# Patient Record
Sex: FEMALE | Race: WHITE | NOT HISPANIC OR LATINO | Employment: FULL TIME | URBAN - NONMETROPOLITAN AREA
[De-identification: names, ages, dates, MRNs, and addresses within clinical notes are randomized per-mention and may not be internally consistent; named-entity substitution may affect disease eponyms.]

---

## 2017-02-07 ENCOUNTER — OFFICE VISIT (OUTPATIENT)
Dept: GASTROENTEROLOGY | Facility: CLINIC | Age: 52
End: 2017-02-07

## 2017-02-07 VITALS
HEART RATE: 90 BPM | BODY MASS INDEX: 22.28 KG/M2 | HEIGHT: 68 IN | WEIGHT: 147 LBS | SYSTOLIC BLOOD PRESSURE: 102 MMHG | DIASTOLIC BLOOD PRESSURE: 60 MMHG | OXYGEN SATURATION: 99 % | TEMPERATURE: 97 F

## 2017-02-07 DIAGNOSIS — Z83.71 FAMILY HISTORY OF POLYPS IN THE COLON: ICD-10-CM

## 2017-02-07 DIAGNOSIS — Z12.11 COLON CANCER SCREENING: Primary | ICD-10-CM

## 2017-02-07 PROBLEM — Z83.719 FAMILY HISTORY OF POLYPS IN THE COLON: Status: ACTIVE | Noted: 2017-02-07

## 2017-02-07 PROCEDURE — S0260 H&P FOR SURGERY: HCPCS | Performed by: NURSE PRACTITIONER

## 2017-02-07 RX ORDER — SODIUM, POTASSIUM,MAG SULFATES 17.5-3.13G
2 SOLUTION, RECONSTITUTED, ORAL ORAL ONCE
Qty: 2 BOTTLE | Refills: 0 | Status: SHIPPED | OUTPATIENT
Start: 2017-02-07 | End: 2017-02-07

## 2017-02-07 NOTE — PROGRESS NOTES
Chief Complaint   Patient presents with   • Colon Cancer Screening     Patient is here as a new patient for colon screening.      Subjective   HPI  Rebeka Severino is a 51 y.o. female who presents as a referral for preventative maintenance. She has no complaints of nausea or vomiting. No change in bowels. No wt loss. No BRBPR. No melena. There is no family hx for colon cancer. No abdominal pain. The patient has not had a colonoscopy in the past.  History reviewed. No pertinent past medical history.  Past Surgical History   Procedure Laterality Date   • Dilatation and curettage     • Hysterectomy       without BSO   • Cholecystectomy         Current Outpatient Prescriptions:   •  cetirizine (zyrTEC) 10 MG tablet, Take 10 mg by mouth Daily., Disp: , Rfl:   •  DHEA 10 MG tablet, 1 po Q day, Disp: 30 tablet, Rfl: 12  •  estradiol (ESTRACE) 1 MG tablet, Take 1.5 tablets by mouth Daily., Disp: 45 tablet, Rfl: 12  •  spironolactone (ALDACTONE) 100 MG tablet, Take 1 tablet by mouth Daily., Disp: 60 tablet, Rfl: 12  •  sodium-potassium-magnesium sulfates (SUPREP BOWEL PREP) solution oral solution, Take 2 bottles by mouth 1 (One) Time for 1 dose. Split dose prep as directed by office instructions provided.  2 bottles = one kit., Disp: 2 bottle, Rfl: 0  No Known Allergies  Social History     Social History   • Marital status:      Spouse name: N/A   • Number of children: N/A   • Years of education: N/A     Occupational History   • Not on file.     Social History Main Topics   • Smoking status: Never Smoker   • Smokeless tobacco: Never Used   • Alcohol use No   • Drug use: No   • Sexual activity: Yes     Partners: Male     Birth control/ protection: Post-menopausal     Other Topics Concern   • Not on file     Social History Narrative     Family History   Problem Relation Age of Onset   • Coronary artery disease Father    • Colon polyps Father    • Hypertension Mother    • No Known Problems Brother    • No Known Problems  "Sister    • Breast cancer Maternal Aunt    • Breast cancer Cousin    • Ovarian cancer Neg Hx    • Uterine cancer Neg Hx    • Colon cancer Neg Hx    • Melanoma Neg Hx    • Prostate cancer Neg Hx    • Crohn's disease Neg Hx    • Esophageal cancer Neg Hx    • Liver cancer Neg Hx    • Liver disease Neg Hx    • Rectal cancer Neg Hx    • Stomach cancer Neg Hx        REVIEW OF SYSTEMS  General: well appearing, no fever chills or sweats, no unexplained wt loss  HEENT: no acute visual or hearing disturbances  Cardiovascular: No chest pain or palpitations  Pulmonary: No shortness of breath, coughing, wheezing or hemoptysis  : No burning, urgency, hematuria, or dysuria  Musculoskeletal: No joint pain or stiffness  Peripheral: no edema  Skin: No lesions or rashes  Neuro: No dizziness, headaches, stroke, syncope  Endocrine: No hot or cold intolerances  Hematological: No blood dyscrasias    Objective   Vitals:    02/07/17 0931   BP: 102/60   Pulse: 90   Temp: 97 °F (36.1 °C)   SpO2: 99%   Weight: 147 lb (66.7 kg)   Height: 68\" (172.7 cm)     Body mass index is 22.35 kg/(m^2).    PHYSICAL EXAM  General: age appropriate well nourished well appearing, no acute distress  Head: normocephalic and atraumatic  Global assessment-supple  Neck-No JVD noted, no lymphadenopathy  Pulmonary-clear to auscultation bilaterally, normal respiratory effort  Cardiovascular-normal rate and rhythm, normal heart sounds, S1 and S2 noted  Abdomen-soft, non tender, non distended, normal bowel sounds all 4 quadrants, no hepatosplenomegaly noted  Extremities-No clubbing cyanosis or edema  Neuro-Non focal, converses appropriately, awake, alert, oriented    Imaging Results (most recent)     None        Assessment/Plan   Rebeka was seen today for colon cancer screening.    Diagnoses and all orders for this visit:    Colon cancer screening  -     Case request    Family history of polyps in the colon  Comments:  father >60  Orders:  -     Case request    Other " orders  -     sodium-potassium-magnesium sulfates (SUPREP BOWEL PREP) solution oral solution; Take 2 bottles by mouth 1 (One) Time for 1 dose. Split dose prep as directed by office instructions provided.  2 bottles = one kit.      COLONOSCOPY WITH ANESTHESIA (N/A)  No Follow-up on file.  There are no Patient Instructions on file for this visit.    All risks, benefits, alternatives, and indications of colonoscopy procedure have been discussed with the patient. Risks to include perforation of the colon requiring possible surgery or colostomy, risk of bleeding from biopsies or removal of colon tissue, possibility of missing a colon polyp or cancer, or adverse drug reaction.  Benefits to include the diagnosis and management of disease of the colon and rectum. Alternatives to include barium enema, radiographic evaluation, lab testing or no intervention. Pt verbalizes understanding and agrees.

## 2017-02-28 ENCOUNTER — ANESTHESIA EVENT (OUTPATIENT)
Dept: GASTROENTEROLOGY | Facility: HOSPITAL | Age: 52
End: 2017-02-28

## 2017-03-02 ENCOUNTER — ANESTHESIA (OUTPATIENT)
Dept: GASTROENTEROLOGY | Facility: HOSPITAL | Age: 52
End: 2017-03-02

## 2017-03-02 ENCOUNTER — TELEPHONE (OUTPATIENT)
Dept: GASTROENTEROLOGY | Facility: CLINIC | Age: 52
End: 2017-03-02

## 2017-03-02 ENCOUNTER — HOSPITAL ENCOUNTER (OUTPATIENT)
Facility: HOSPITAL | Age: 52
Setting detail: HOSPITAL OUTPATIENT SURGERY
Discharge: HOME | End: 2017-03-02
Attending: INTERNAL MEDICINE | Admitting: INTERNAL MEDICINE

## 2017-03-02 VITALS
HEIGHT: 68 IN | WEIGHT: 148 LBS | RESPIRATION RATE: 21 BRPM | SYSTOLIC BLOOD PRESSURE: 101 MMHG | OXYGEN SATURATION: 100 % | BODY MASS INDEX: 22.43 KG/M2 | HEART RATE: 56 BPM | TEMPERATURE: 97.3 F | DIASTOLIC BLOOD PRESSURE: 75 MMHG

## 2017-03-02 PROCEDURE — 45378 DIAGNOSTIC COLONOSCOPY: CPT | Performed by: INTERNAL MEDICINE

## 2017-03-02 PROCEDURE — 25010000002 PROPOFOL 10 MG/ML EMULSION: Performed by: NURSE ANESTHETIST, CERTIFIED REGISTERED

## 2017-03-02 RX ORDER — SODIUM CHLORIDE 0.9 % (FLUSH) 0.9 %
1-10 SYRINGE (ML) INJECTION AS NEEDED
Status: CANCELLED | OUTPATIENT
Start: 2017-03-02

## 2017-03-02 RX ORDER — SODIUM CHLORIDE 9 MG/ML
100 INJECTION, SOLUTION INTRAVENOUS CONTINUOUS
Status: CANCELLED | OUTPATIENT
Start: 2017-03-02

## 2017-03-02 RX ORDER — LIDOCAINE HYDROCHLORIDE 20 MG/ML
INJECTION, SOLUTION INFILTRATION; PERINEURAL AS NEEDED
Status: DISCONTINUED | OUTPATIENT
Start: 2017-03-02 | End: 2017-03-02 | Stop reason: SURG

## 2017-03-02 RX ORDER — PROPOFOL 10 MG/ML
VIAL (ML) INTRAVENOUS AS NEEDED
Status: DISCONTINUED | OUTPATIENT
Start: 2017-03-02 | End: 2017-03-02 | Stop reason: SURG

## 2017-03-02 RX ORDER — SODIUM CHLORIDE 0.9 % (FLUSH) 0.9 %
1-10 SYRINGE (ML) INJECTION AS NEEDED
Status: DISCONTINUED | OUTPATIENT
Start: 2017-03-02 | End: 2017-03-02 | Stop reason: HOSPADM

## 2017-03-02 RX ORDER — SODIUM CHLORIDE 9 MG/ML
100 INJECTION, SOLUTION INTRAVENOUS CONTINUOUS
Status: DISCONTINUED | OUTPATIENT
Start: 2017-03-02 | End: 2017-03-02 | Stop reason: HOSPADM

## 2017-03-02 RX ADMIN — LIDOCAINE HYDROCHLORIDE 50 MG: 20 INJECTION, SOLUTION INFILTRATION; PERINEURAL at 07:35

## 2017-03-02 RX ADMIN — SODIUM CHLORIDE 100 ML/HR: 9 INJECTION, SOLUTION INTRAVENOUS at 07:04

## 2017-03-02 RX ADMIN — PROPOFOL 75 MG: 10 INJECTION, EMULSION INTRAVENOUS at 07:45

## 2017-03-02 RX ADMIN — PROPOFOL 100 MG: 10 INJECTION, EMULSION INTRAVENOUS at 07:38

## 2017-03-02 RX ADMIN — PROPOFOL 50 MG: 10 INJECTION, EMULSION INTRAVENOUS at 07:40

## 2017-03-02 RX ADMIN — PROPOFOL 50 MG: 10 INJECTION, EMULSION INTRAVENOUS at 07:50

## 2017-03-02 RX ADMIN — PROPOFOL 50 MG: 10 INJECTION, EMULSION INTRAVENOUS at 07:44

## 2017-03-02 NOTE — PLAN OF CARE
Problem: GI Endoscopy (Adult)  Goal: Signs and Symptoms of Listed Potential Problems Will be Absent or Manageable (GI Endoscopy)  Outcome: Outcome(s) achieved Date Met:  03/02/17

## 2017-03-02 NOTE — PLAN OF CARE
Problem: Patient Care Overview (Adult)  Goal: Plan of Care Review  Outcome: Ongoing (interventions implemented as appropriate)    03/02/17 0807   Patient Care Overview   Progress improving   Outcome Evaluation   Outcome Summary/Follow up Plan no problem noted during procedure

## 2017-03-02 NOTE — ANESTHESIA PREPROCEDURE EVALUATION
Anesthesia Evaluation     Patient summary reviewed   NPO Status: > 4 hours   Airway   Mallampati: I  TM distance: >3 FB  Neck ROM: full  no difficulty expected  Dental - normal exam     Pulmonary - negative pulmonary ROS and normal exam   Cardiovascular - negative cardio ROS  Exercise tolerance: good (4-7 METS)    Rhythm: regular  Rate: normal        Neuro/Psych  GI/Hepatic/Renal/Endo - negative ROS     Musculoskeletal (-) negative ROS    Abdominal  - normal exam   Substance History      OB/GYN negative ob/gyn ROS         Other                                    Anesthesia Plan    ASA 1     general     Anesthetic plan and risks discussed with patient.

## 2017-03-02 NOTE — ANESTHESIA POSTPROCEDURE EVALUATION
Patient: Rebeka Severino    Procedure Summary     Date Anesthesia Start Anesthesia Stop Room / Location    03/02/17 0735 0801  [facility] / [facility]       Procedure Diagnosis Surgeon Provider    COLONOSCOPY WITH ANESTHESIA (N/A ) Family history of polyps in the colon; Colon cancer screening  (Family history of polyps in the colon [Z83.71]; Colon cancer screening [Z12.11]) MD Jacob Beck CRNA          Anesthesia Type: general  Last vitals  BP      Temp      Pulse     Resp      SpO2        Post Anesthesia Care and Evaluation    Patient location during evaluation: PHASE II  Patient participation: complete - patient participated  Level of consciousness: awake  Pain score: 0  Pain management: adequate  Airway patency: patent  Anesthetic complications: No anesthetic complications  PONV Status: none  Cardiovascular status: acceptable  Respiratory status: acceptable  Hydration status: acceptable

## 2017-03-02 NOTE — PLAN OF CARE
Problem: Patient Care Overview (Adult)  Goal: Plan of Care Review  Outcome: Outcome(s) achieved Date Met:  03/02/17

## 2017-06-21 RX ORDER — FLUCONAZOLE 150 MG/1
150 TABLET ORAL DAILY
Qty: 2 TABLET | Refills: 0 | Status: SHIPPED | OUTPATIENT
Start: 2017-06-21 | End: 2017-11-27

## 2017-06-21 NOTE — TELEPHONE ENCOUNTER
Patient called this morning c/o yeast infection. Patient states she has tried a few OTC remedies and have not helped. Patient is requesting Vani Velez to call her something in. Sent to Vani to review.

## 2017-07-27 RX ORDER — BUSPIRONE HYDROCHLORIDE 10 MG/1
10 TABLET ORAL DAILY
Qty: 30 TABLET | Refills: 6 | Status: SHIPPED | OUTPATIENT
Start: 2017-07-27 | End: 2017-11-27 | Stop reason: SDUPTHER

## 2017-07-27 NOTE — TELEPHONE ENCOUNTER
Patient requesting refill on Buspirone HCL 10 mg, po daily. Chart is reviewed in [employer]. Original RX wrote 09/22/15. Patient is up to date on yearly until November. Sent to Vani Velez to approve.

## 2017-09-21 DIAGNOSIS — B37.9 CANDIDIASIS: Primary | ICD-10-CM

## 2017-09-21 RX ORDER — FLUCONAZOLE 150 MG/1
150 TABLET ORAL ONCE
Qty: 2 TABLET | Refills: 0 | Status: SHIPPED | OUTPATIENT
Start: 2017-09-21 | End: 2017-09-21

## 2017-11-27 ENCOUNTER — OFFICE VISIT (OUTPATIENT)
Dept: OBSTETRICS AND GYNECOLOGY | Facility: CLINIC | Age: 52
End: 2017-11-27

## 2017-11-27 VITALS
HEIGHT: 68 IN | DIASTOLIC BLOOD PRESSURE: 68 MMHG | BODY MASS INDEX: 22.58 KG/M2 | WEIGHT: 149 LBS | SYSTOLIC BLOOD PRESSURE: 126 MMHG

## 2017-11-27 DIAGNOSIS — F41.9 ANXIETY: ICD-10-CM

## 2017-11-27 DIAGNOSIS — Z01.419 WELL WOMAN EXAM WITH ROUTINE GYNECOLOGICAL EXAM: Primary | ICD-10-CM

## 2017-11-27 DIAGNOSIS — E01.0 THYROMEGALY: ICD-10-CM

## 2017-11-27 DIAGNOSIS — Z80.3 FAMILY HISTORY OF BREAST CANCER: ICD-10-CM

## 2017-11-27 DIAGNOSIS — L68.0 HIRSUTISM: ICD-10-CM

## 2017-11-27 DIAGNOSIS — N95.1 MENOPAUSAL SYMPTOMS: ICD-10-CM

## 2017-11-27 PROCEDURE — 99213 OFFICE O/P EST LOW 20 MIN: CPT | Performed by: NURSE PRACTITIONER

## 2017-11-27 PROCEDURE — 99396 PREV VISIT EST AGE 40-64: CPT | Performed by: NURSE PRACTITIONER

## 2017-11-27 RX ORDER — BUSPIRONE HYDROCHLORIDE 10 MG/1
10 TABLET ORAL DAILY
Qty: 30 TABLET | Refills: 12 | Status: SHIPPED | OUTPATIENT
Start: 2017-11-27 | End: 2020-06-02 | Stop reason: HOSPADM

## 2017-11-27 RX ORDER — ESTRADIOL 1 MG/1
1.5 TABLET ORAL DAILY
Qty: 45 TABLET | Refills: 12 | Status: SHIPPED | OUTPATIENT
Start: 2017-11-27 | End: 2019-02-18 | Stop reason: SDUPTHER

## 2017-11-27 RX ORDER — SPIRONOLACTONE 100 MG/1
100 TABLET, FILM COATED ORAL DAILY
Qty: 60 TABLET | Refills: 12 | Status: SHIPPED | OUTPATIENT
Start: 2017-11-27 | End: 2019-01-10 | Stop reason: SDUPTHER

## 2017-11-27 NOTE — PROGRESS NOTES
"Subjective   Rebeka Severino is a 52 y.o. female     HPI Comments: Here for yearly. Has C/O hot flashes.    Gynecologic Exam   The patient's pertinent negatives include no pelvic pain, vaginal bleeding or vaginal discharge. The patient is experiencing no pain. Pertinent negatives include no abdominal pain, anorexia, back pain, chills, constipation, diarrhea, discolored urine, dysuria, fever, flank pain, frequency, headaches, hematuria, joint pain, joint swelling, nausea, painful intercourse, rash, sore throat, urgency or vomiting. She is sexually active. She uses hysterectomy for contraception. She is postmenopausal.   Anxiety   Presents for follow-up visit. Symptoms include excessive worry and nervous/anxious behavior. Patient reports no chest pain, compulsions, confusion, decreased concentration, depressed mood, dizziness, dry mouth, feeling of choking, hyperventilation, impotence, insomnia, irritability, malaise, muscle tension, nausea, obsessions, palpitations, panic, restlessness, shortness of breath or suicidal ideas. Symptoms occur constantly. The severity of symptoms is mild. The quality of sleep is good. Nighttime awakenings: occasional.     Compliance with medications is %.             /68  Ht 68\" (172.7 cm)  Wt 149 lb (67.6 kg)  LMP 11/27/2009 (Approximate)  BMI 22.66 kg/m2      Outpatient Encounter Prescriptions as of 11/27/2017   Medication Sig Dispense Refill   • cetirizine (zyrTEC) 10 MG tablet Take 10 mg by mouth Daily.     • DHEA 10 MG tablet 1 po Q day 30 tablet 12   • estradiol (ESTRACE) 1 MG tablet Take 1.5 tablets by mouth Daily. 45 tablet 12   • spironolactone (ALDACTONE) 100 MG tablet Take 1 tablet by mouth Daily. 60 tablet 12   • [DISCONTINUED] estradiol (ESTRACE) 1 MG tablet Take 1.5 tablets by mouth Daily. 45 tablet 12   • [DISCONTINUED] spironolactone (ALDACTONE) 100 MG tablet Take 1 tablet by mouth Daily. 60 tablet 12   • busPIRone (BUSPAR) 10 MG tablet Take 1 tablet by mouth " Daily. 30 tablet 12   • [DISCONTINUED] busPIRone (BUSPAR) 10 MG tablet Take 1 tablet by mouth Daily. 30 tablet 6   • [DISCONTINUED] fluconazole (DIFLUCAN) 150 MG tablet Take 1 tablet by mouth Daily. 2 tablet 0     No facility-administered encounter medications on file as of 11/27/2017.            Surgical History  Past Surgical History:   Procedure Laterality Date   • CHOLECYSTECTOMY     • COLONOSCOPY N/A 3/2/2017    Procedure: COLONOSCOPY WITH ANESTHESIA;  Surgeon: Jennifer Gallegos MD;  Location: [facility] ENDOSCOPY;  Service:    • DILATATION AND CURETTAGE     • HYSTERECTOMY      without BSO         Family History  Family History   Problem Relation Age of Onset   • Coronary artery disease Father    • Colon polyps Father    • Hypertension Mother    • No Known Problems Brother    • No Known Problems Sister    • Breast cancer Maternal Aunt 60   • Breast cancer Cousin 40   • Ovarian cancer Neg Hx    • Uterine cancer Neg Hx    • Colon cancer Neg Hx    • Melanoma Neg Hx    • Prostate cancer Neg Hx    • Crohn's disease Neg Hx    • Esophageal cancer Neg Hx    • Liver cancer Neg Hx    • Liver disease Neg Hx    • Rectal cancer Neg Hx    • Stomach cancer Neg Hx              The following portions of the patient's history were reviewed and updated as appropriate: allergies, current medications, past family history, past medical history, past social history, past surgical history and problem list.    Review of Systems   Constitutional: Negative for activity change, appetite change, chills, diaphoresis, fatigue, fever, irritability and unexpected weight change.   HENT: Negative for congestion, ear discharge, ear pain, facial swelling, hearing loss, mouth sores, nosebleeds, postnasal drip, rhinorrhea, sinus pressure, sneezing, sore throat, tinnitus, trouble swallowing and voice change.    Eyes: Negative for photophobia, pain, discharge, redness, itching and visual disturbance.   Respiratory: Negative for apnea, cough, choking, chest  tightness and shortness of breath.    Cardiovascular: Negative for chest pain, palpitations and leg swelling.   Gastrointestinal: Negative for abdominal distention, abdominal pain, anal bleeding, anorexia, blood in stool, constipation, diarrhea, nausea, rectal pain and vomiting.   Endocrine: Negative for cold intolerance and heat intolerance.   Genitourinary: Negative for decreased urine volume, difficulty urinating, dyspareunia, dysuria, flank pain, frequency, genital sores, hematuria, impotence, menstrual problem, pelvic pain, urgency, vaginal bleeding, vaginal discharge and vaginal pain.   Musculoskeletal: Negative for arthralgias, back pain, joint pain, joint swelling and myalgias.   Skin: Negative for color change and rash.   Allergic/Immunologic: Negative for environmental allergies.   Neurological: Negative for dizziness, syncope, weakness, numbness and headaches.   Hematological: Negative for adenopathy.   Psychiatric/Behavioral: Negative for agitation, confusion, decreased concentration, sleep disturbance and suicidal ideas. The patient is nervous/anxious. The patient does not have insomnia.              Objective   Physical Exam   Constitutional: She is oriented to person, place, and time. She appears well-developed and well-nourished. No distress.   HENT:   Head: Normocephalic.   Right Ear: External ear normal.   Left Ear: External ear normal.   Nose: Nose normal.   Mouth/Throat: Oropharynx is clear and moist.   Eyes: Conjunctivae are normal. Right eye exhibits no discharge. Left eye exhibits no discharge. No scleral icterus.   Neck: Normal range of motion. Neck supple. Carotid bruit is not present. No tracheal deviation present. Thyromegaly present.   Cardiovascular: Normal rate, regular rhythm, normal heart sounds and intact distal pulses.    No murmur heard.  Pulmonary/Chest: Effort normal and breath sounds normal. No respiratory distress. She has no wheezes. Right breast exhibits no inverted nipple,  no mass, no nipple discharge, no skin change and no tenderness. Left breast exhibits no inverted nipple, no mass, no nipple discharge, no skin change and no tenderness. Breasts are symmetrical. There is no breast swelling.   Abdominal: Soft. She exhibits no distension and no mass. There is no tenderness. There is no guarding. No hernia. Hernia confirmed negative in the right inguinal area and confirmed negative in the left inguinal area.   Genitourinary: Vagina normal. Rectal exam shows no mass. No breast tenderness, discharge or bleeding. Pelvic exam was performed with patient supine. There is no rash, tenderness, lesion or injury on the right labia. There is no rash, tenderness, lesion or injury on the left labia. Right adnexum displays no mass, no tenderness and no fullness. Left adnexum displays no mass, no tenderness and no fullness. No erythema, tenderness or bleeding in the vagina. No foreign body in the vagina. No signs of injury around the vagina. No vaginal discharge found.   Genitourinary Comments:   BSU normal  Urethral meatus  Normal  Perineum  Normal   Musculoskeletal: Normal range of motion. She exhibits no edema or tenderness.   Lymphadenopathy:        Head (right side): No submental, no submandibular, no tonsillar, no preauricular, no posterior auricular and no occipital adenopathy present.        Head (left side): No submental, no submandibular, no tonsillar, no preauricular, no posterior auricular and no occipital adenopathy present.     She has no cervical adenopathy.        Right cervical: No superficial cervical, no deep cervical and no posterior cervical adenopathy present.       Left cervical: No superficial cervical, no deep cervical and no posterior cervical adenopathy present.     She has no axillary adenopathy.        Right: No inguinal adenopathy present.        Left: No inguinal adenopathy present.   Neurological: She is alert and oriented to person, place, and time. Coordination  normal.   Skin: Skin is warm and dry. No bruising and no rash noted. She is not diaphoretic. No erythema.   Psychiatric: She has a normal mood and affect. Her behavior is normal. Judgment and thought content normal.   Nursing note and vitals reviewed.        Assessment/Plan   Rebeka was seen today for gynecologic exam.    Diagnoses and all orders for this visit:    Well woman exam with routine gynecological exam  Normal GYN exam. Will RTO for lab work Encouraged SBE. Discussed weight management and importance of maintaining a healthy weight. Discussed Vitamin D intake and the importance of adequate vitamin D. Discussed Daily exercise and the importance of same. BMI is 22.7.  Colonoscopy is up to date.  Mammogram and Bone density will be scheduled at [facility].  Pap smear is not done per ASCCP guidelines.  -     CBC & Differential  -     Comprehensive Metabolic Panel  -     Lipid Panel With LDL / HDL Ratio  -     TSH  -     T3, Uptake  -     Vitamin D 25 Hydroxy  -     T4, Free  -     Hemoglobin A1c  -     UA / M With / Rflx Culture([facility] ONLY) - Urine, Clean Catch  -     Mammo Screening Digital Tomosynthesis Bilateral With CAD; Future  -     DEXA Bone Density Axial; Future    Menopausal symptoms  Comments:  Having some hot flashes, even taking Estrace 1 1/2 mg per day. Will check Estrogen level.  Orders:  -     Estradiol  -     estradiol (ESTRACE) 1 MG tablet; Take 1.5 tablets by mouth Daily.  -     DHEA-Sulfate    Thyromegaly  Comments:  Thyroid is enlarged, will RTO for lab and will get Thyroid U/S at [facility].  Orders:  -     Thyroid Antibodies  -     US Thyroid; Future    Family history of breast cancer  Comments:  Pt has a strong family history of breast cancer, 2 aunts in their 60's, but a Maternal cousin in her 40's. Discussed My Risk testing.    Anxiety  Comments:  Doing well with Buspar.  Orders:  -     busPIRone (BUSPAR) 10 MG tablet; Take 1 tablet by mouth Daily.    Hirsutism  Comments:  Doing well with  Aldactone.  Orders:  -     spironolactone (ALDACTONE) 100 MG tablet; Take 1 tablet by mouth Daily.

## 2017-12-05 ENCOUNTER — HOSPITAL ENCOUNTER (OUTPATIENT)
Dept: MAMMOGRAPHY | Facility: HOSPITAL | Age: 52
Discharge: HOME | End: 2017-12-05
Admitting: NURSE PRACTITIONER

## 2017-12-05 ENCOUNTER — HOSPITAL ENCOUNTER (OUTPATIENT)
Dept: ULTRASOUND IMAGING | Facility: HOSPITAL | Age: 52
Discharge: HOME | End: 2017-12-05

## 2017-12-05 ENCOUNTER — HOSPITAL ENCOUNTER (OUTPATIENT)
Dept: BONE DENSITY | Facility: HOSPITAL | Age: 52
Discharge: HOME | End: 2017-12-05

## 2017-12-05 DIAGNOSIS — Z01.419 WELL WOMAN EXAM WITH ROUTINE GYNECOLOGICAL EXAM: ICD-10-CM

## 2017-12-05 DIAGNOSIS — E01.0 THYROMEGALY: ICD-10-CM

## 2017-12-05 DIAGNOSIS — M85.89 OSTEOPENIA OF MULTIPLE SITES: Primary | ICD-10-CM

## 2017-12-05 PROCEDURE — 77063 BREAST TOMOSYNTHESIS BI: CPT

## 2017-12-05 PROCEDURE — 76536 US EXAM OF HEAD AND NECK: CPT

## 2017-12-05 PROCEDURE — G0202 SCR MAMMO BI INCL CAD: HCPCS

## 2017-12-05 PROCEDURE — 77080 DXA BONE DENSITY AXIAL: CPT

## 2017-12-05 RX ORDER — RISEDRONATE SODIUM 150 MG/1
150 TABLET, FILM COATED ORAL
Qty: 1 TABLET | Refills: 12 | Status: SHIPPED | OUTPATIENT
Start: 2017-12-05 | End: 2019-03-04

## 2017-12-06 LAB
25(OH)D3+25(OH)D2 SERPL-MCNC: 57.8 NG/ML (ref 30–100)
ALBUMIN SERPL-MCNC: 4.3 G/DL (ref 3.5–5)
ALBUMIN/GLOB SERPL: 1.7 G/DL (ref 1.1–2.5)
ALP SERPL-CCNC: 49 U/L (ref 24–120)
ALT SERPL-CCNC: 31 U/L (ref 0–54)
APPEARANCE UR: CLEAR
AST SERPL-CCNC: 24 U/L (ref 7–45)
BACTERIA #/AREA URNS HPF: NORMAL /HPF
BASOPHILS # BLD AUTO: 0.03 10*3/MM3 (ref 0–0.2)
BASOPHILS NFR BLD AUTO: 0.7 % (ref 0–2)
BILIRUB SERPL-MCNC: 0.8 MG/DL (ref 0.1–1)
BILIRUB UR QL STRIP: NEGATIVE
BUN SERPL-MCNC: 14 MG/DL (ref 5–21)
BUN/CREAT SERPL: 17.9 (ref 7–25)
CALCIUM SERPL-MCNC: 9.4 MG/DL (ref 8.4–10.4)
CHLORIDE SERPL-SCNC: 101 MMOL/L (ref 98–110)
CHOLEST SERPL-MCNC: 132 MG/DL (ref 130–200)
CO2 SERPL-SCNC: 31 MMOL/L (ref 24–31)
COLOR UR: YELLOW
CREAT SERPL-MCNC: 0.78 MG/DL (ref 0.5–1.4)
DHEA-S SERPL-MCNC: 41.1 UG/DL (ref 41.2–243.7)
EOSINOPHIL # BLD AUTO: 0.2 10*3/MM3 (ref 0–0.7)
EOSINOPHIL NFR BLD AUTO: 5 % (ref 0–4)
EPI CELLS #/AREA URNS HPF: NORMAL /HPF
ERYTHROCYTE [DISTWIDTH] IN BLOOD BY AUTOMATED COUNT: 13.5 % (ref 12–15)
ESTRADIOL SERPL-MCNC: 53.3 PG/ML
GFR SERPLBLD CREATININE-BSD FMLA CKD-EPI: 78 ML/MIN/1.73
GFR SERPLBLD CREATININE-BSD FMLA CKD-EPI: 94 ML/MIN/1.73
GLOBULIN SER CALC-MCNC: 2.6 GM/DL
GLUCOSE SERPL-MCNC: 85 MG/DL (ref 70–100)
GLUCOSE UR QL: NEGATIVE
HBA1C MFR BLD: 5.1 %
HCT VFR BLD AUTO: 39.4 % (ref 37–47)
HDLC SERPL-MCNC: 58 MG/DL
HGB BLD-MCNC: 12.4 G/DL (ref 12–16)
HGB UR QL STRIP: NEGATIVE
IMM GRANULOCYTES # BLD: 0.01 10*3/MM3 (ref 0–0.03)
IMM GRANULOCYTES NFR BLD: 0.2 % (ref 0–5)
KETONES UR QL STRIP: NEGATIVE
LDLC SERPL CALC-MCNC: 62 MG/DL (ref 0–99)
LDLC/HDLC SERPL: 1.07 {RATIO}
LEUKOCYTE ESTERASE UR QL STRIP: NEGATIVE
LYMPHOCYTES # BLD AUTO: 1.48 10*3/MM3 (ref 0.72–4.86)
LYMPHOCYTES NFR BLD AUTO: 36.7 % (ref 15–45)
MCH RBC QN AUTO: 29.7 PG (ref 28–32)
MCHC RBC AUTO-ENTMCNC: 31.5 G/DL (ref 33–36)
MCV RBC AUTO: 94.3 FL (ref 82–98)
MICRO URNS: NORMAL
MICRO URNS: NORMAL
MONOCYTES # BLD AUTO: 0.39 10*3/MM3 (ref 0.19–1.3)
MONOCYTES NFR BLD AUTO: 9.7 % (ref 4–12)
MUCOUS THREADS URNS QL MICRO: PRESENT /HPF
NEUTROPHILS # BLD AUTO: 1.92 10*3/MM3 (ref 1.87–8.4)
NEUTROPHILS NFR BLD AUTO: 47.7 % (ref 39–78)
NITRITE UR QL STRIP: NEGATIVE
NRBC BLD AUTO-RTO: 0 /100 WBC (ref 0–0)
PH UR STRIP: 5.5 [PH] (ref 5–7.5)
PLATELET # BLD AUTO: 221 10*3/MM3 (ref 130–400)
POTASSIUM SERPL-SCNC: 3.9 MMOL/L (ref 3.5–5.3)
PROT SERPL-MCNC: 6.9 G/DL (ref 6.3–8.7)
PROT UR QL STRIP: NEGATIVE
RBC # BLD AUTO: 4.18 10*6/MM3 (ref 4.2–5.4)
RBC #/AREA URNS HPF: NORMAL /HPF
SODIUM SERPL-SCNC: 142 MMOL/L (ref 135–145)
SP GR UR: 1.02 (ref 1–1.03)
T3RU NFR SERPL: 26 % (ref 24–39)
T4 FREE SERPL-MCNC: 1.13 NG/DL (ref 0.78–2.19)
THYROGLOB AB SERPL-ACNC: <1 IU/ML (ref 0–0.9)
THYROPEROXIDASE AB SERPL-ACNC: <6 IU/ML (ref 0–34)
TRIGL SERPL-MCNC: 59 MG/DL (ref 0–149)
TSH SERPL DL<=0.005 MIU/L-ACNC: 1.33 MIU/ML (ref 0.47–4.68)
URINALYSIS REFLEX: NORMAL
UROBILINOGEN UR STRIP-MCNC: 0.2 MG/DL (ref 0.2–1)
VLDLC SERPL CALC-MCNC: 11.8 MG/DL
WBC # BLD AUTO: 4.03 10*3/MM3 (ref 4.8–10.8)
WBC #/AREA URNS HPF: NORMAL /HPF

## 2018-05-24 ENCOUNTER — TELEPHONE (OUTPATIENT)
Dept: OBSTETRICS AND GYNECOLOGY | Facility: CLINIC | Age: 53
End: 2018-05-24

## 2018-05-24 RX ORDER — FLUCONAZOLE 150 MG/1
150 TABLET ORAL DAILY
Qty: 2 TABLET | Refills: 0 | Status: SHIPPED | OUTPATIENT
Start: 2018-05-24 | End: 2019-03-04

## 2018-05-24 NOTE — TELEPHONE ENCOUNTER
Per az Cisse to send in Diflucan 150mg to pt pharmacy. Take 1 now and repeat in 1 week. No refills. Send to [facility].

## 2019-01-07 DIAGNOSIS — L68.0 HIRSUTISM: ICD-10-CM

## 2019-01-07 RX ORDER — SPIRONOLACTONE 100 MG/1
100 TABLET, FILM COATED ORAL DAILY
Qty: 60 TABLET | Refills: 3 | OUTPATIENT
Start: 2019-01-07

## 2019-01-10 DIAGNOSIS — L68.0 HIRSUTISM: ICD-10-CM

## 2019-01-10 NOTE — TELEPHONE ENCOUNTER
Pt left voicemail she knows she needs to make an appt. Please call her to schedule appt yearly or med refill

## 2019-01-15 RX ORDER — SPIRONOLACTONE 100 MG/1
100 TABLET, FILM COATED ORAL DAILY
Qty: 60 TABLET | Refills: 0 | Status: SHIPPED | OUTPATIENT
Start: 2019-01-15 | End: 2019-03-14 | Stop reason: SDUPTHER

## 2019-02-16 DIAGNOSIS — N95.1 MENOPAUSAL SYMPTOMS: ICD-10-CM

## 2019-02-18 ENCOUNTER — TELEPHONE (OUTPATIENT)
Dept: OBSTETRICS AND GYNECOLOGY | Facility: CLINIC | Age: 54
End: 2019-02-18

## 2019-02-18 DIAGNOSIS — N95.1 MENOPAUSAL SYMPTOMS: ICD-10-CM

## 2019-02-18 RX ORDER — ESTRADIOL 1 MG/1
1.5 TABLET ORAL DAILY
Qty: 45 TABLET | Refills: 1 | Status: SHIPPED | OUTPATIENT
Start: 2019-02-18 | End: 2019-03-21 | Stop reason: SDUPTHER

## 2019-02-18 RX ORDER — ESTRADIOL 1 MG/1
1.5 TABLET ORAL DAILY
Qty: 135 TABLET | Refills: 3 | OUTPATIENT
Start: 2019-02-18

## 2019-02-18 NOTE — TELEPHONE ENCOUNTER
Pt is requesting refill of her estradiol her appt was rescheduled due to provider being out sick and she is scheduled 3/19/19. [facility]

## 2019-03-04 ENCOUNTER — OFFICE VISIT (OUTPATIENT)
Dept: OBSTETRICS AND GYNECOLOGY | Facility: CLINIC | Age: 54
End: 2019-03-04

## 2019-03-04 VITALS
BODY MASS INDEX: 23.04 KG/M2 | WEIGHT: 152 LBS | SYSTOLIC BLOOD PRESSURE: 114 MMHG | DIASTOLIC BLOOD PRESSURE: 70 MMHG | HEIGHT: 68 IN

## 2019-03-04 DIAGNOSIS — F40.243 ANXIETY WITH FLYING: ICD-10-CM

## 2019-03-04 DIAGNOSIS — Z12.31 SCREENING MAMMOGRAM, ENCOUNTER FOR: ICD-10-CM

## 2019-03-04 DIAGNOSIS — Z01.419 ENCOUNTER FOR GYNECOLOGICAL EXAMINATION WITHOUT ABNORMAL FINDING: Primary | ICD-10-CM

## 2019-03-04 PROCEDURE — 99396 PREV VISIT EST AGE 40-64: CPT | Performed by: OBSTETRICS & GYNECOLOGY

## 2019-03-04 RX ORDER — ALPRAZOLAM 0.25 MG/1
0.25 TABLET ORAL 2 TIMES DAILY PRN
Qty: 15 TABLET | Refills: 0 | Status: SHIPPED | OUTPATIENT
Start: 2019-03-04 | End: 2021-06-22

## 2019-03-04 RX ORDER — MAGNESIUM GLUCONATE 27 MG(500)
27 TABLET ORAL 2 TIMES DAILY
COMMUNITY

## 2019-03-04 NOTE — PROGRESS NOTES
Subjective   Rebeka Severino is a 53 y.o. female  YOB: 1965    Chief Complaint   Patient presents with   • Gynecologic Exam     Here for annual exam, had hysterectomy for bleeding issues, still has ovaries.  Need mammogram for BHP.        54 yo  Patient's last menstrual period was 2009 due to hysterectomy presents for annual examination. Last annual examination was in 2017. Patient offers no complaints at this time.             No Known Allergies    Past Medical History:   Diagnosis Date   • Anxiety        Family History   Problem Relation Age of Onset   • Coronary artery disease Father    • Colon polyps Father    • Hypertension Mother    • Aortic aneurysm Mother    • No Known Problems Brother    • No Known Problems Sister    • Breast cancer Maternal Aunt 60   • Breast cancer Cousin 40   • No Known Problems Daughter    • No Known Problems Son    • No Known Problems Maternal Grandmother    • No Known Problems Paternal Grandmother    • No Known Problems Paternal Aunt    • Ovarian cancer Neg Hx    • Uterine cancer Neg Hx    • Colon cancer Neg Hx    • Melanoma Neg Hx    • Prostate cancer Neg Hx    • Crohn's disease Neg Hx    • Esophageal cancer Neg Hx    • Liver cancer Neg Hx    • Liver disease Neg Hx    • Rectal cancer Neg Hx    • Stomach cancer Neg Hx    • BRCA 1/2 Neg Hx    • Endometrial cancer Neg Hx        Social History     Socioeconomic History   • Marital status:      Spouse name: Not on file   • Number of children: Not on file   • Years of education: Not on file   • Highest education level: Not on file   Social Needs   • Financial resource strain: Not on file   • Food insecurity - worry: Not on file   • Food insecurity - inability: Not on file   • Transportation needs - medical: Not on file   • Transportation needs - non-medical: Not on file   Occupational History   • Not on file   Tobacco Use   • Smoking status: Never Smoker   • Smokeless tobacco: Never Used   Substance and  Sexual Activity   • Alcohol use: No   • Drug use: No   • Sexual activity: Yes     Birth control/protection: Post-menopausal, Surgical   Other Topics Concern   • Not on file   Social History Narrative   • Not on file         Current Outpatient Medications:   •  cetirizine (zyrTEC) 10 MG tablet, Take 10 mg by mouth Daily., Disp: , Rfl:   •  DHEA 10 MG tablet, 1 po Q day, Disp: 30 tablet, Rfl: 12  •  estradiol (ESTRACE) 1 MG tablet, Take 1.5 tablets by mouth Daily., Disp: 45 tablet, Rfl: 1  •  magnesium gluconate (MAGONATE) 500 MG tablet, Take 27 mg by mouth 2 (Two) Times a Day., Disp: , Rfl:   •  spironolactone (ALDACTONE) 100 MG tablet, TAKE 1 TABLET BY MOUTH DAILY., Disp: 60 tablet, Rfl: 0  •  ALPRAZolam (XANAX) 0.25 MG tablet, Take 1 tablet by mouth 2 (Two) Times a Day As Needed for Anxiety., Disp: 15 tablet, Rfl: 0  •  busPIRone (BUSPAR) 10 MG tablet, Take 1 tablet by mouth Daily., Disp: 30 tablet, Rfl: 12    Patient's last menstrual period was 11/27/2009 (approximate).    Sexual History:         Could not be calculated    Past Surgical History:   Procedure Laterality Date   • CHOLECYSTECTOMY     • COLONOSCOPY N/A 3/2/2017    Procedure: COLONOSCOPY WITH ANESTHESIA;  Surgeon: Jennifer Gallegos MD;  Location: [facility] ENDOSCOPY;  Service:    • DILATATION AND CURETTAGE     • HYSTERECTOMY      without BSO       Review of Systems   Constitutional: Negative for activity change and unexpected weight loss.   HENT: Negative for congestion.    Cardiovascular: Negative for chest pain.   Gastrointestinal: Negative for blood in stool, constipation and diarrhea.   Endocrine: Negative for cold intolerance and heat intolerance.   Genitourinary: Negative for dyspareunia, pelvic pain and vaginal discharge.   Musculoskeletal: Negative for arthralgias, back pain, neck pain and neck stiffness.   Skin: Negative for rash.   Neurological: Negative for dizziness and headache.   Psychiatric/Behavioral: Negative for sleep disturbance. The  "patient is nervous/anxious (only when patient is flying).        Objective   Physical Exam   Constitutional: She is oriented to person, place, and time. She appears well-developed and well-nourished. No distress.   HENT:   Head: Normocephalic and atraumatic.   Eyes: EOM are normal.   Neck: Normal range of motion. Neck supple. No thyromegaly present.   Cardiovascular: Normal rate and regular rhythm.   No murmur heard.  Pulmonary/Chest: Effort normal and breath sounds normal.   Abdominal: Soft. She exhibits no distension. There is no tenderness.   Genitourinary: Vagina normal. Pelvic exam was performed with patient supine. There is no tenderness or lesion on the right labia. There is no tenderness or lesion on the left labia. Right adnexum displays no tenderness and no fullness. Left adnexum displays no tenderness and no fullness. No bleeding in the vagina. No vaginal discharge found.   Genitourinary Comments: Pt s/p hysterectomy, uterus and cervix surgically absent, vaginal cuff unremarkable.    Musculoskeletal: Normal range of motion. She exhibits no edema.   Neurological: She is alert and oriented to person, place, and time.   Skin: Skin is warm and dry.   Psychiatric: She has a normal mood and affect. Her behavior is normal. Judgment normal.   Nursing note and vitals reviewed.        Vitals:    03/04/19 0843   BP: 114/70   BP Location: Left arm   Patient Position: Sitting   Cuff Size: Adult   Weight: 68.9 kg (152 lb)   Height: 172.7 cm (68\")       Rebeka was seen today for gynecologic exam.    Diagnoses and all orders for this visit:    Encounter for gynecological examination without abnormal finding  -     CBC & Differential  -     Cancel: Lipid Panel With LDL / HDL Ratio  -     TSH  -     T3, Uptake  -     Vitamin D 25 Hydroxy  -     T4, Free  -     Hemoglobin A1c  -     DHEA-Sulfate  -     Urinalysis With Microscopic If Indicated (No Culture) - Urine, Clean Catch  -     Estradiol    Screening mammogram, " encounter for  -     Mammo Screening Digital Tomosynthesis Bilateral With CAD; Future    Other orders  -     ALPRAZolam (XANAX) 0.25 MG tablet; Take 1 tablet by mouth 2 (Two) Times a Day As Needed for Anxiety.    -Discussed with patient that there was no need to do PAP smear at this time since she has previous history of normal PAP smears   -Sent referral for mammogram at this time   -Labs ordered with results to follow   -Xanax sent to pharmacy.     Amanda Fortune, DO

## 2019-03-04 NOTE — PROGRESS NOTES
,Attempted to obtain health maintenance information, patient unable to provide answers for the following items Tdap, Influenza Vaccine and Zoster Vaccine.

## 2019-03-05 LAB
25(OH)D3+25(OH)D2 SERPL-MCNC: 46.7 NG/ML (ref 30–100)
BASOPHILS # BLD AUTO: 0.04 10*3/MM3 (ref 0–0.2)
BASOPHILS NFR BLD AUTO: 1 % (ref 0–2)
DHEA-S SERPL-MCNC: 307.8 UG/DL (ref 41.2–243.7)
EOSINOPHIL # BLD AUTO: 0.15 10*3/MM3 (ref 0–0.7)
EOSINOPHIL NFR BLD AUTO: 3.6 % (ref 0–4)
ERYTHROCYTE [DISTWIDTH] IN BLOOD BY AUTOMATED COUNT: 13 % (ref 12–15)
ESTRADIOL SERPL-MCNC: 28.7 PG/ML
HBA1C MFR BLD: 5.1 %
HCT VFR BLD AUTO: 39.7 % (ref 37–47)
HGB BLD-MCNC: 12.8 G/DL (ref 12–16)
IMM GRANULOCYTES # BLD AUTO: 0.01 10*3/MM3 (ref 0–0.05)
IMM GRANULOCYTES NFR BLD AUTO: 0.2 % (ref 0–5)
LYMPHOCYTES # BLD AUTO: 1.39 10*3/MM3 (ref 0.72–4.86)
LYMPHOCYTES NFR BLD AUTO: 33.7 % (ref 15–45)
MCH RBC QN AUTO: 30.6 PG (ref 28–32)
MCHC RBC AUTO-ENTMCNC: 32.2 G/DL (ref 33–36)
MCV RBC AUTO: 95 FL (ref 82–98)
MONOCYTES # BLD AUTO: 0.42 10*3/MM3 (ref 0.19–1.3)
MONOCYTES NFR BLD AUTO: 10.2 % (ref 4–12)
NEUTROPHILS # BLD AUTO: 2.11 10*3/MM3 (ref 1.87–8.4)
NEUTROPHILS NFR BLD AUTO: 51.3 % (ref 39–78)
NRBC BLD AUTO-RTO: 0 /100 WBC (ref 0–0)
PLATELET # BLD AUTO: 219 10*3/MM3 (ref 130–400)
RBC # BLD AUTO: 4.18 10*6/MM3 (ref 4.2–5.4)
T3RU NFR SERPL: 24 % (ref 24–39)
T4 FREE SERPL-MCNC: 1.07 NG/DL (ref 0.78–2.19)
TSH SERPL DL<=0.005 MIU/L-ACNC: 0.91 MIU/ML (ref 0.47–4.68)
WBC # BLD AUTO: 4.12 10*3/MM3 (ref 4.8–10.8)

## 2019-03-14 DIAGNOSIS — L68.0 HIRSUTISM: ICD-10-CM

## 2019-03-14 RX ORDER — SPIRONOLACTONE 100 MG/1
100 TABLET, FILM COATED ORAL DAILY
Qty: 60 TABLET | Refills: 0 | Status: SHIPPED | OUTPATIENT
Start: 2019-03-14 | End: 2020-05-22

## 2019-03-21 ENCOUNTER — OFFICE VISIT (OUTPATIENT)
Dept: OBSTETRICS AND GYNECOLOGY | Facility: CLINIC | Age: 54
End: 2019-03-21

## 2019-03-21 VITALS
HEIGHT: 68 IN | SYSTOLIC BLOOD PRESSURE: 102 MMHG | BODY MASS INDEX: 23.04 KG/M2 | WEIGHT: 152 LBS | DIASTOLIC BLOOD PRESSURE: 78 MMHG

## 2019-03-21 DIAGNOSIS — N95.1 MENOPAUSAL SYMPTOMS: ICD-10-CM

## 2019-03-21 PROCEDURE — 99213 OFFICE O/P EST LOW 20 MIN: CPT | Performed by: OBSTETRICS & GYNECOLOGY

## 2019-03-21 RX ORDER — PHENOL 1.4 %
600 AEROSOL, SPRAY (ML) MUCOUS MEMBRANE DAILY
COMMUNITY
End: 2022-06-30

## 2019-03-21 RX ORDER — ESTRADIOL 1 MG/1
1.5 TABLET ORAL DAILY
Qty: 45 TABLET | Refills: 2 | Status: SHIPPED | OUTPATIENT
Start: 2019-03-21 | End: 2019-09-18 | Stop reason: SDUPTHER

## 2019-03-22 ENCOUNTER — HOSPITAL ENCOUNTER (OUTPATIENT)
Dept: MAMMOGRAPHY | Facility: HOSPITAL | Age: 54
Discharge: HOME | End: 2019-03-22
Admitting: OBSTETRICS & GYNECOLOGY

## 2019-03-22 DIAGNOSIS — Z12.31 SCREENING MAMMOGRAM, ENCOUNTER FOR: ICD-10-CM

## 2019-03-22 PROCEDURE — 77063 BREAST TOMOSYNTHESIS BI: CPT

## 2019-03-22 PROCEDURE — 77067 SCR MAMMO BI INCL CAD: CPT

## 2019-03-24 NOTE — PROGRESS NOTES
"Subjective   Rebeka Severino is a 53 y.o. female.     Chief Complaint   Patient presents with   • Results     Here to discuss her recent lab work. Needs refill of her Estrace PO.        52 yo para 3 presents to discuss labs results. Patient had markedly elevated DHEAS. No other complaints at this time. Patient requesting refill on Estrace at this time.          Review of Systems    Objective   /78 (BP Location: Left arm, Patient Position: Sitting, Cuff Size: Adult)   Ht 172.7 cm (68\")   Wt 68.9 kg (152 lb)   LMP 11/27/2009 (Approximate)   BMI 23.11 kg/m²   Patient's last menstrual period was 11/27/2009 (approximate).  Physical Exam   Constitutional: She is oriented to person, place, and time. She appears well-developed and well-nourished.   HENT:   Head: Normocephalic and atraumatic.   Eyes: EOM are normal.   Neck: Normal range of motion.   Pulmonary/Chest: Effort normal.   Musculoskeletal: Normal range of motion.   Neurological: She is alert and oriented to person, place, and time.   Skin: Skin is warm and dry.   Psychiatric: She has a normal mood and affect. Her behavior is normal. Judgment normal.   Nursing note and vitals reviewed.    Assessment/Plan   Problems Addressed this Visit     None      Visit Diagnoses     Menopausal symptoms        Having some hot flashes, even taking Estrace 1 1/2 mg per day. Will check Estrogen level.    Relevant Medications    estradiol (ESTRACE) 1 MG tablet      -Patient instructed to stop taking DHEA at this time.   -Refill of Estrace sent to pharmacy.    -Return for recheck of lab       Amanda Fortune, DO  "

## 2019-03-25 DIAGNOSIS — R92.8 ABNORMAL MAMMOGRAM OF RIGHT BREAST: Primary | ICD-10-CM

## 2019-03-26 ENCOUNTER — HOSPITAL ENCOUNTER (OUTPATIENT)
Dept: MAMMOGRAPHY | Facility: HOSPITAL | Age: 54
Discharge: HOME | End: 2019-03-26
Admitting: OBSTETRICS & GYNECOLOGY

## 2019-03-26 ENCOUNTER — HOSPITAL ENCOUNTER (OUTPATIENT)
Dept: ULTRASOUND IMAGING | Facility: HOSPITAL | Age: 54
Discharge: HOME | End: 2019-03-26

## 2019-03-26 DIAGNOSIS — R92.8 ABNORMAL MAMMOGRAM OF RIGHT BREAST: ICD-10-CM

## 2019-03-26 PROCEDURE — 76642 ULTRASOUND BREAST LIMITED: CPT

## 2019-03-26 PROCEDURE — 77065 DX MAMMO INCL CAD UNI: CPT

## 2019-03-26 PROCEDURE — G0279 TOMOSYNTHESIS, MAMMO: HCPCS

## 2019-09-18 DIAGNOSIS — N95.1 MENOPAUSAL SYMPTOMS: ICD-10-CM

## 2019-09-18 RX ORDER — ESTRADIOL 1 MG/1
1.5 TABLET ORAL DAILY
Qty: 135 TABLET | Refills: 2 | Status: SHIPPED | OUTPATIENT
Start: 2019-09-18 | End: 2019-12-17

## 2020-05-18 ENCOUNTER — TELEPHONE (OUTPATIENT)
Dept: OBSTETRICS AND GYNECOLOGY | Facility: CLINIC | Age: 55
End: 2020-05-18

## 2020-05-18 DIAGNOSIS — Z12.39 SCREENING FOR BREAST CANCER: Primary | ICD-10-CM

## 2020-05-18 NOTE — TELEPHONE ENCOUNTER
PT CALLED REQUESTING ORDER FOR MAMMOGRAM BE PUT IN SO SHE CAN HAVE IT DONE THE SAME DAY AS HER APT

## 2020-05-22 ENCOUNTER — HOSPITAL ENCOUNTER (OUTPATIENT)
Dept: MAMMOGRAPHY | Facility: HOSPITAL | Age: 55
Discharge: HOME | End: 2020-05-22
Admitting: OBSTETRICS & GYNECOLOGY

## 2020-05-22 DIAGNOSIS — Z12.39 SCREENING FOR BREAST CANCER: ICD-10-CM

## 2020-05-22 DIAGNOSIS — L68.0 HIRSUTISM: ICD-10-CM

## 2020-05-22 PROCEDURE — 77063 BREAST TOMOSYNTHESIS BI: CPT

## 2020-05-22 PROCEDURE — 77067 SCR MAMMO BI INCL CAD: CPT

## 2020-05-22 RX ORDER — SPIRONOLACTONE 100 MG/1
100 TABLET, FILM COATED ORAL DAILY
Qty: 90 TABLET | Refills: 7 | Status: SHIPPED | OUTPATIENT
Start: 2020-05-22 | End: 2021-08-04

## 2020-06-02 ENCOUNTER — OFFICE VISIT (OUTPATIENT)
Dept: OBSTETRICS AND GYNECOLOGY | Facility: CLINIC | Age: 55
End: 2020-06-02

## 2020-06-02 VITALS
HEIGHT: 68 IN | DIASTOLIC BLOOD PRESSURE: 66 MMHG | WEIGHT: 150 LBS | BODY MASS INDEX: 22.73 KG/M2 | SYSTOLIC BLOOD PRESSURE: 104 MMHG

## 2020-06-02 DIAGNOSIS — F41.9 ANXIETY: ICD-10-CM

## 2020-06-02 DIAGNOSIS — Z01.419 ENCOUNTER FOR ANNUAL ROUTINE GYNECOLOGICAL EXAMINATION: Primary | ICD-10-CM

## 2020-06-02 PROCEDURE — 99396 PREV VISIT EST AGE 40-64: CPT | Performed by: OBSTETRICS & GYNECOLOGY

## 2020-06-02 RX ORDER — ALPRAZOLAM 0.25 MG/1
0.25 TABLET ORAL 2 TIMES DAILY PRN
Qty: 30 TABLET | Refills: 0 | Status: SHIPPED | OUTPATIENT
Start: 2020-06-02 | End: 2021-06-22

## 2020-06-02 RX ORDER — ESTRADIOL 1 MG/1
TABLET ORAL
COMMUNITY
Start: 2020-03-28 | End: 2020-06-29 | Stop reason: SDUPTHER

## 2020-06-02 NOTE — PROGRESS NOTES
Subjective   Rebeka Severino is a 54 y.o. female  YOB: 1965    Chief Complaint   Patient presents with   • Gynecologic Exam     Pt is here for annual exam Pt is doing well no c/o        54 year old para 3 female s/p hysterectomy presents for annual examination. Patient reports no complaints at this time. She reports no history of abnormal PAP smears prior to her hysterectomy. She reports that she previously had a hysterectomy due to dysmenorrhea. Her last mammogram was BIRAD 2 in 5-2020. She is sexually active with her  without complaints at this time. She does reports that her father was recently diagnosed with Lewy Body dementia. She reports a family history of breast and ovarian caycer.         No Known Allergies    Past Medical History:   Diagnosis Date   • Anxiety        Family History   Problem Relation Age of Onset   • Coronary artery disease Father    • Colon polyps Father    • Hypertension Mother    • Aortic aneurysm Mother    • No Known Problems Brother    • No Known Problems Sister    • Breast cancer Maternal Aunt 60   • Breast cancer Cousin 40   • No Known Problems Daughter    • No Known Problems Son    • No Known Problems Maternal Grandmother    • No Known Problems Paternal Grandmother    • No Known Problems Paternal Aunt    • Ovarian cancer Neg Hx    • Uterine cancer Neg Hx    • Colon cancer Neg Hx    • Melanoma Neg Hx    • Prostate cancer Neg Hx    • Crohn's disease Neg Hx    • Esophageal cancer Neg Hx    • Liver cancer Neg Hx    • Liver disease Neg Hx    • Rectal cancer Neg Hx    • Stomach cancer Neg Hx    • BRCA 1/2 Neg Hx    • Endometrial cancer Neg Hx        Social History     Socioeconomic History   • Marital status:      Spouse name: Not on file   • Number of children: Not on file   • Years of education: Not on file   • Highest education level: Not on file   Tobacco Use   • Smoking status: Never Smoker   • Smokeless tobacco: Never Used   Substance and Sexual Activity    • Alcohol use: No   • Drug use: No   • Sexual activity: Yes     Birth control/protection: Post-menopausal, Surgical         Current Outpatient Medications:   •  ALPRAZolam (XANAX) 0.25 MG tablet, Take 1 tablet by mouth 2 (Two) Times a Day As Needed for Anxiety. (Patient taking differently: Take 0.25 mg by mouth 2 (Two) Times a Day As Needed for Anxiety (as needed).), Disp: 15 tablet, Rfl: 0  •  calcium carbonate (OS-NIKKO) 600 MG tablet, Take 600 mg by mouth Daily., Disp: , Rfl:   •  cetirizine (zyrTEC) 10 MG tablet, Take 10 mg by mouth Daily., Disp: , Rfl:   •  estradiol (ESTRACE) 1 MG tablet, TAKE 1.5 TABLETS BY MOUTH DAILY FOR 90 DAYS., Disp: , Rfl:   •  magnesium gluconate (MAGONATE) 500 MG tablet, Take 27 mg by mouth 2 (Two) Times a Day., Disp: , Rfl:   •  spironolactone (ALDACTONE) 100 MG tablet, TAKE 1 TABLET BY MOUTH DAILY., Disp: 90 tablet, Rfl: 7  •  ALPRAZolam (Xanax) 0.25 MG tablet, Take 1 tablet by mouth 2 (Two) Times a Day As Needed for Anxiety., Disp: 30 tablet, Rfl: 0  •  sertraline (Zoloft) 50 MG tablet, Take 1 tablet by mouth Daily for 120 days., Disp: 30 tablet, Rfl: 3    Patient's last menstrual period was 11/27/2009 (approximate).    Sexual History:         Could not be calculated    Past Surgical History:   Procedure Laterality Date   • CHOLECYSTECTOMY     • COLONOSCOPY N/A 3/2/2017    Procedure: COLONOSCOPY WITH ANESTHESIA;  Surgeon: Jennifer Gallegos MD;  Location: [facility] ENDOSCOPY;  Service:    • DILATATION AND CURETTAGE     • HYSTERECTOMY      without BSO       Review of Systems   Constitutional: Negative for activity change and unexpected weight loss.   HENT: Negative for congestion.    Cardiovascular: Negative for chest pain.   Gastrointestinal: Negative for blood in stool, constipation and diarrhea.   Endocrine: Negative for cold intolerance and heat intolerance.   Genitourinary: Negative for dyspareunia, pelvic pain and vaginal discharge.   Musculoskeletal: Negative for arthralgias, back  "pain, neck pain and neck stiffness.   Skin: Negative for rash.   Neurological: Negative for dizziness and headache.   Psychiatric/Behavioral: Positive for depressed mood and stress (her dad was recently diagnosed with Lewy body dementia ). Negative for self-injury, sleep disturbance and suicidal ideas. The patient is nervous/anxious.        Objective   Physical Exam   Constitutional: She is oriented to person, place, and time. She appears well-developed and well-nourished. No distress.   HENT:   Head: Normocephalic and atraumatic.   Eyes: EOM are normal.   Neck: Normal range of motion. Neck supple. No thyromegaly present.   Cardiovascular: Normal rate and regular rhythm.   No murmur heard.  Pulmonary/Chest: Effort normal and breath sounds normal.   Abdominal: Soft. She exhibits no distension. There is no tenderness.   Genitourinary: Vagina normal. Pelvic exam was performed with patient supine. There is no tenderness or lesion on the right labia. There is no tenderness or lesion on the left labia. Right adnexum displays no tenderness and no fullness. Left adnexum displays no tenderness and no fullness. No bleeding in the vagina. No vaginal discharge found.   Genitourinary Comments: Pt s/p hysterectomy, uterus and cervix surgically absent, vaginal cuff unremarkable.    Musculoskeletal: Normal range of motion. She exhibits no edema.   Neurological: She is alert and oriented to person, place, and time.   Skin: Skin is warm and dry.   Psychiatric: She has a normal mood and affect. Her behavior is normal. Judgment normal.   Nursing note and vitals reviewed.    Vitals:    06/02/20 0822   BP: 104/66   Weight: 68 kg (150 lb)   Height: 172.7 cm (68\")       Rebeka was seen today for gynecologic exam.    Diagnoses and all orders for this visit:    Encounter for annual routine gynecological examination  -     Vitamin D 25 Hydroxy  -     T4, Free  -     CBC & Differential  -     Comprehensive Metabolic Panel  -     Lipid Panel " With LDL / HDL Ratio  -     TSH  -     DHEA-Sulfate  -     Estradiol    Anxiety  -     ALPRAZolam (Xanax) 0.25 MG tablet; Take 1 tablet by mouth 2 (Two) Times a Day As Needed for Anxiety.    Other orders  -     sertraline (Zoloft) 50 MG tablet; Take 1 tablet by mouth Daily for 120 days.    -Labs today with results to follow   -PAP Smear not indicated at this time.   -Will start patient on Zoloft and refill prn Xanax. Discussed if symptoms worsen she would need to discontinue medication.   -Also discussed genetic counseling at this time due to fmaily history. Patient would like to talk with her  before referral is sent   -Drug contract signed at this time for Xanax as well as Rashawn run.   -RTC In 3 months for follow up examination or sooner if symptoms worsen  Amanda Fortune, DO

## 2020-06-03 LAB
25(OH)D3+25(OH)D2 SERPL-MCNC: 47.2 NG/ML (ref 30–100)
ALBUMIN SERPL-MCNC: 4.9 G/DL (ref 3.5–5.2)
ALBUMIN/GLOB SERPL: 2 G/DL
ALP SERPL-CCNC: 60 U/L (ref 39–117)
ALT SERPL-CCNC: 14 U/L (ref 1–33)
AST SERPL-CCNC: 18 U/L (ref 1–32)
BASOPHILS # BLD AUTO: 0.05 10*3/MM3 (ref 0–0.2)
BASOPHILS NFR BLD AUTO: 1.3 % (ref 0–1.5)
BILIRUB SERPL-MCNC: 0.7 MG/DL (ref 0.2–1.2)
BUN SERPL-MCNC: 10 MG/DL (ref 6–20)
BUN/CREAT SERPL: 12 (ref 7–25)
CALCIUM SERPL-MCNC: 9.8 MG/DL (ref 8.6–10.5)
CHLORIDE SERPL-SCNC: 99 MMOL/L (ref 98–107)
CHOLEST SERPL-MCNC: 143 MG/DL (ref 0–200)
CO2 SERPL-SCNC: 27.9 MMOL/L (ref 22–29)
CREAT SERPL-MCNC: 0.83 MG/DL (ref 0.57–1)
DHEA-S SERPL-MCNC: 30.3 UG/DL (ref 41.2–243.7)
EOSINOPHIL # BLD AUTO: 0.15 10*3/MM3 (ref 0–0.4)
EOSINOPHIL NFR BLD AUTO: 4 % (ref 0.3–6.2)
ERYTHROCYTE [DISTWIDTH] IN BLOOD BY AUTOMATED COUNT: 12.8 % (ref 12.3–15.4)
GLOBULIN SER CALC-MCNC: 2.5 GM/DL
GLUCOSE SERPL-MCNC: 88 MG/DL (ref 65–99)
HCT VFR BLD AUTO: 39.9 % (ref 34–46.6)
HDLC SERPL-MCNC: 61 MG/DL (ref 40–60)
HGB BLD-MCNC: 13.4 G/DL (ref 12–15.9)
IMM GRANULOCYTES # BLD AUTO: 0.01 10*3/MM3 (ref 0–0.05)
IMM GRANULOCYTES NFR BLD AUTO: 0.3 % (ref 0–0.5)
LDLC SERPL CALC-MCNC: 71 MG/DL (ref 0–100)
LDLC/HDLC SERPL: 1.16 {RATIO}
LYMPHOCYTES # BLD AUTO: 1.28 10*3/MM3 (ref 0.7–3.1)
LYMPHOCYTES NFR BLD AUTO: 34 % (ref 19.6–45.3)
MCH RBC QN AUTO: 30.9 PG (ref 26.6–33)
MCHC RBC AUTO-ENTMCNC: 33.6 G/DL (ref 31.5–35.7)
MCV RBC AUTO: 92.1 FL (ref 79–97)
MONOCYTES # BLD AUTO: 0.37 10*3/MM3 (ref 0.1–0.9)
MONOCYTES NFR BLD AUTO: 9.8 % (ref 5–12)
NEUTROPHILS # BLD AUTO: 1.9 10*3/MM3 (ref 1.7–7)
NEUTROPHILS NFR BLD AUTO: 50.6 % (ref 42.7–76)
NRBC BLD AUTO-RTO: 0 /100 WBC (ref 0–0.2)
PLATELET # BLD AUTO: 224 10*3/MM3 (ref 140–450)
POTASSIUM SERPL-SCNC: 4.5 MMOL/L (ref 3.5–5.2)
PROT SERPL-MCNC: 7.4 G/DL (ref 6–8.5)
RBC # BLD AUTO: 4.33 10*6/MM3 (ref 3.77–5.28)
SODIUM SERPL-SCNC: 138 MMOL/L (ref 136–145)
T4 FREE SERPL-MCNC: 1.26 NG/DL (ref 0.93–1.7)
TRIGL SERPL-MCNC: 55 MG/DL (ref 0–150)
TSH SERPL DL<=0.005 MIU/L-ACNC: 1.13 UIU/ML (ref 0.27–4.2)
VLDLC SERPL CALC-MCNC: 11 MG/DL
WBC # BLD AUTO: 3.76 10*3/MM3 (ref 3.4–10.8)

## 2020-06-19 RX ORDER — ESTRADIOL 1 MG/1
TABLET ORAL
Qty: 45 TABLET | Refills: 1 | OUTPATIENT
Start: 2020-06-19

## 2020-06-29 ENCOUNTER — TELEPHONE (OUTPATIENT)
Dept: OBSTETRICS AND GYNECOLOGY | Facility: CLINIC | Age: 55
End: 2020-06-29

## 2020-06-29 RX ORDER — ESTRADIOL 1 MG/1
1.5 TABLET ORAL DAILY
Qty: 45 TABLET | Refills: 3 | Status: SHIPPED | OUTPATIENT
Start: 2020-06-29 | End: 2020-07-29

## 2020-06-29 NOTE — TELEPHONE ENCOUNTER
----- Message from Amanda Fortune DO sent at 6/29/2020  8:10 AM CDT -----  Please let the patient know that her Vitamin D was slightly low.   Her thyroid was normal.   Her cholesterol was within normal limits  Her DHEA was slightly low. I think we should start her back on DHEA a very low dose. Please send her DHEA 10 mg and have her take it daily for a month.

## 2020-08-20 RX ORDER — ESTRADIOL 1 MG/1
1.5 TABLET ORAL DAILY
Qty: 45 TABLET | Refills: 0 | Status: SHIPPED | OUTPATIENT
Start: 2020-08-20 | End: 2020-09-23

## 2020-09-23 RX ORDER — ESTRADIOL 1 MG/1
TABLET ORAL
Qty: 45 TABLET | Refills: 0 | Status: SHIPPED | OUTPATIENT
Start: 2020-09-23 | End: 2022-07-25 | Stop reason: SDUPTHER

## 2020-09-23 RX ORDER — ESTRADIOL 1 MG/1
TABLET ORAL
Qty: 135 TABLET | Refills: 2 | Status: SHIPPED | OUTPATIENT
Start: 2020-09-23 | End: 2021-06-21

## 2021-06-21 RX ORDER — ESTRADIOL 1 MG/1
TABLET ORAL
Qty: 135 TABLET | Refills: 2 | Status: SHIPPED | OUTPATIENT
Start: 2021-06-21 | End: 2022-04-22

## 2021-06-22 ENCOUNTER — OFFICE VISIT (OUTPATIENT)
Dept: OBSTETRICS AND GYNECOLOGY | Facility: CLINIC | Age: 56
End: 2021-06-22

## 2021-06-22 VITALS
SYSTOLIC BLOOD PRESSURE: 110 MMHG | BODY MASS INDEX: 19.85 KG/M2 | WEIGHT: 131 LBS | HEIGHT: 68 IN | DIASTOLIC BLOOD PRESSURE: 60 MMHG

## 2021-06-22 DIAGNOSIS — F41.9 ANXIETY: ICD-10-CM

## 2021-06-22 DIAGNOSIS — Z01.419 ENCOUNTER FOR GYNECOLOGICAL EXAMINATION WITHOUT ABNORMAL FINDING: Primary | ICD-10-CM

## 2021-06-22 DIAGNOSIS — Z80.3 FAMILY HISTORY OF BREAST CANCER: ICD-10-CM

## 2021-06-22 DIAGNOSIS — Z12.31 SCREENING MAMMOGRAM, ENCOUNTER FOR: ICD-10-CM

## 2021-06-22 PROCEDURE — 99396 PREV VISIT EST AGE 40-64: CPT | Performed by: OBSTETRICS & GYNECOLOGY

## 2021-06-22 RX ORDER — ALPRAZOLAM 0.5 MG/1
0.25 TABLET ORAL 2 TIMES DAILY PRN
Qty: 20 TABLET | Refills: 0 | Status: SHIPPED | OUTPATIENT
Start: 2021-06-22 | End: 2022-06-30 | Stop reason: SDUPTHER

## 2021-06-22 RX ORDER — ALPRAZOLAM 0.5 MG/1
TABLET ORAL
COMMUNITY
Start: 2021-04-09 | End: 2021-06-22 | Stop reason: SDUPTHER

## 2021-06-22 NOTE — PROGRESS NOTES
Subjective   Rebeka Severino is a 55 y.o. female  YOB: 1965    Chief Complaint   Patient presents with   • Gynecologic Exam     pt here for annual exam. Last pap 2016, normal. h/o hysterectomy, still has ovaries.  Last mamm 2020, negative.     55 year old female  presents for annual examination. Patient reports no complaints at this time. She reports occasional hot flashes and night sweats. She is currently on estradiol. She reports that it isn't affecting her quality of sleep. She previously had her hysterectomy due to menorrhagia and dysmenorrhea. She reports a previous history of normal pap smears. Her last pap smear was in  and was negative. She denies drinking, smoking or drug use. She is sexually active with her  without issues. She reports a family history of breast cancer.       No Known Allergies    Past Medical History:   Diagnosis Date   • Anxiety        Family History   Problem Relation Age of Onset   • Coronary artery disease Father    • Colon polyps Father    • Hypertension Mother    • Aortic aneurysm Mother    • No Known Problems Brother    • No Known Problems Sister    • Breast cancer Maternal Aunt 60   • Breast cancer Cousin 40   • No Known Problems Daughter    • No Known Problems Son    • No Known Problems Maternal Grandmother    • No Known Problems Paternal Grandmother    • No Known Problems Paternal Aunt    • Ovarian cancer Neg Hx    • Uterine cancer Neg Hx    • Colon cancer Neg Hx    • Melanoma Neg Hx    • Prostate cancer Neg Hx    • Crohn's disease Neg Hx    • Esophageal cancer Neg Hx    • Liver cancer Neg Hx    • Liver disease Neg Hx    • Rectal cancer Neg Hx    • Stomach cancer Neg Hx    • BRCA 1/2 Neg Hx    • Endometrial cancer Neg Hx        Social History     Socioeconomic History   • Marital status:      Spouse name: Not on file   • Number of children: Not on file   • Years of education: Not on file   • Highest education level: Not on file    Tobacco Use   • Smoking status: Never Smoker   • Smokeless tobacco: Never Used   Substance and Sexual Activity   • Alcohol use: No   • Drug use: No   • Sexual activity: Yes     Birth control/protection: Post-menopausal, Surgical         Current Outpatient Medications:   •  ALPRAZolam (XANAX) 0.5 MG tablet, Take 0.5 tablets by mouth 2 (Two) Times a Day As Needed for Anxiety., Disp: 20 tablet, Rfl: 0  •  calcium carbonate (OS-NIKKO) 600 MG tablet, Take 600 mg by mouth Daily., Disp: , Rfl:   •  cetirizine (zyrTEC) 10 MG tablet, Take 10 mg by mouth Daily., Disp: , Rfl:   •  estradiol (ESTRACE) 1 MG tablet, TAKE 1 AND 1/2 TABLETS BY MOUTH EVERY DAY, Disp: 45 tablet, Rfl: 0  •  estradiol (ESTRACE) 1 MG tablet, TAKE 1.5 TABLETS BY MOUTH DAILY FOR 90 DAYS., Disp: 135 tablet, Rfl: 2  •  magnesium gluconate (MAGONATE) 500 MG tablet, Take 27 mg by mouth 2 (Two) Times a Day., Disp: , Rfl:   •  spironolactone (ALDACTONE) 100 MG tablet, TAKE 1 TABLET BY MOUTH DAILY., Disp: 90 tablet, Rfl: 7    Patient's last menstrual period was 11/27/2009 (approximate).    Sexual History:         Could not be calculated    Past Surgical History:   Procedure Laterality Date   • CHOLECYSTECTOMY     • COLONOSCOPY N/A 3/2/2017    Procedure: COLONOSCOPY WITH ANESTHESIA;  Surgeon: Jennifer Gallegos MD;  Location: [facility] ENDOSCOPY;  Service:    • DILATATION AND CURETTAGE     • HYSTERECTOMY      without BSO       Review of Systems   Constitutional: Negative for activity change, unexpected weight gain and unexpected weight loss.   HENT: Negative for congestion.    Cardiovascular: Negative for chest pain.   Gastrointestinal: Positive for abdominal distention. Negative for blood in stool, constipation and diarrhea.   Endocrine: Negative for cold intolerance and heat intolerance.   Genitourinary: Negative for dyspareunia, pelvic pain and vaginal discharge.   Musculoskeletal: Negative for arthralgias, back pain and neck pain.   Skin: Negative for rash.  "  Neurological: Negative for dizziness and headache.   Psychiatric/Behavioral: Negative for sleep disturbance. The patient is not nervous/anxious.        Objective   Physical Exam  Vitals and nursing note reviewed. Exam conducted with a chaperone present.   Constitutional:       General: She is not in acute distress.     Appearance: She is well-developed.   HENT:      Head: Normocephalic and atraumatic.   Neck:      Thyroid: No thyromegaly.   Cardiovascular:      Rate and Rhythm: Normal rate and regular rhythm.      Heart sounds: No murmur heard.     Pulmonary:      Effort: Pulmonary effort is normal.      Breath sounds: Normal breath sounds.   Abdominal:      General: There is no distension.      Palpations: Abdomen is soft.      Tenderness: There is no abdominal tenderness.   Genitourinary:     Exam position: Supine.      Labia:         Right: No tenderness or lesion.         Left: No tenderness or lesion.       Vagina: Normal. No vaginal discharge or bleeding.      Adnexa:         Right: No tenderness or fullness.          Left: No tenderness or fullness.        Comments: Pt s/p hysterectomy, uterus and cervix surgically absent, vaginal cuff unremarkable.   Musculoskeletal:         General: Normal range of motion.      Cervical back: Normal range of motion and neck supple.   Skin:     General: Skin is warm and dry.   Neurological:      Mental Status: She is alert and oriented to person, place, and time.   Psychiatric:         Behavior: Behavior normal.         Judgment: Judgment normal.       Vitals:    06/22/21 0812   BP: 110/60   Weight: 59.4 kg (131 lb)   Height: 172.7 cm (68\")       Diagnoses and all orders for this visit:    1. Encounter for gynecological examination without abnormal finding (Primary)  -     Vitamin D 25 Hydroxy  -     CBC & Differential  -     Comprehensive Metabolic Panel  -     Lipid Panel With LDL / HDL Ratio  -     TSH  -     Hemoglobin A1c  -     T4, Free    2. Screening mammogram, " encounter for  -     Mammo Screening Digital Tomosynthesis Bilateral With CAD; Future    3. Family history of breast cancer  -     Ambulatory Referral to Genetic Counseling/Testing - [facility]    4. Anxiety  -     ALPRAZolam (XANAX) 0.5 MG tablet; Take 0.5 tablets by mouth 2 (Two) Times a Day As Needed for Anxiety.  Dispense: 20 tablet; Refill: 0    Normal GYN exam.   Will have lab work here.   Encouraged SBE, pt is aware how to do self breast exam and the importance of same.   Discussed weight management and importance of maintaining a healthy weight.   Discussed Vitamin D intake and the importance of adequate vitamin D for both Bone Health and a healthy immune system.    Discussed Daily exercise and the importance of same, in regards to a healthy heart as well as helping to maintain her weight and improving her mental health.     Mammogram will be scheduled at [facility]  PAP smear is no indicated at this time   RTC In 2 weeks for TVUS to access ovaries due to bloating at this time.     Amanda Fortune, DO

## 2021-06-23 LAB
25(OH)D3+25(OH)D2 SERPL-MCNC: 47.3 NG/ML (ref 30–100)
ALBUMIN SERPL-MCNC: 4.7 G/DL (ref 3.5–5.2)
ALBUMIN/GLOB SERPL: 2.4 G/DL
ALP SERPL-CCNC: 66 U/L (ref 39–117)
ALT SERPL-CCNC: 14 U/L (ref 1–33)
AST SERPL-CCNC: 19 U/L (ref 1–32)
BASOPHILS # BLD AUTO: 0.03 10*3/MM3 (ref 0–0.2)
BASOPHILS NFR BLD AUTO: 1 % (ref 0–1.5)
BILIRUB SERPL-MCNC: 0.6 MG/DL (ref 0–1.2)
BUN SERPL-MCNC: 10 MG/DL (ref 6–20)
BUN/CREAT SERPL: 11.9 (ref 7–25)
CALCIUM SERPL-MCNC: 9.6 MG/DL (ref 8.6–10.5)
CHLORIDE SERPL-SCNC: 102 MMOL/L (ref 98–107)
CHOLEST SERPL-MCNC: 125 MG/DL (ref 0–200)
CO2 SERPL-SCNC: 29.3 MMOL/L (ref 22–29)
CREAT SERPL-MCNC: 0.84 MG/DL (ref 0.57–1)
EOSINOPHIL # BLD AUTO: 0.11 10*3/MM3 (ref 0–0.4)
EOSINOPHIL NFR BLD AUTO: 3.6 % (ref 0.3–6.2)
ERYTHROCYTE [DISTWIDTH] IN BLOOD BY AUTOMATED COUNT: 12.5 % (ref 12.3–15.4)
GLOBULIN SER CALC-MCNC: 2 GM/DL
GLUCOSE SERPL-MCNC: 86 MG/DL (ref 65–99)
HBA1C MFR BLD: 5.2 % (ref 4.8–5.6)
HCT VFR BLD AUTO: 38.2 % (ref 34–46.6)
HDLC SERPL-MCNC: 62 MG/DL (ref 40–60)
HGB BLD-MCNC: 12.8 G/DL (ref 12–15.9)
IMM GRANULOCYTES # BLD AUTO: 0.01 10*3/MM3 (ref 0–0.05)
IMM GRANULOCYTES NFR BLD AUTO: 0.3 % (ref 0–0.5)
LDLC SERPL CALC-MCNC: 52 MG/DL (ref 0–100)
LDLC/HDLC SERPL: 0.88 {RATIO}
LYMPHOCYTES # BLD AUTO: 1.05 10*3/MM3 (ref 0.7–3.1)
LYMPHOCYTES NFR BLD AUTO: 34 % (ref 19.6–45.3)
MCH RBC QN AUTO: 30.9 PG (ref 26.6–33)
MCHC RBC AUTO-ENTMCNC: 33.5 G/DL (ref 31.5–35.7)
MCV RBC AUTO: 92.3 FL (ref 79–97)
MONOCYTES # BLD AUTO: 0.27 10*3/MM3 (ref 0.1–0.9)
MONOCYTES NFR BLD AUTO: 8.7 % (ref 5–12)
NEUTROPHILS # BLD AUTO: 1.62 10*3/MM3 (ref 1.7–7)
NEUTROPHILS NFR BLD AUTO: 52.4 % (ref 42.7–76)
NRBC BLD AUTO-RTO: 0 /100 WBC (ref 0–0.2)
PLATELET # BLD AUTO: 206 10*3/MM3 (ref 140–450)
POTASSIUM SERPL-SCNC: 4.3 MMOL/L (ref 3.5–5.2)
PROT SERPL-MCNC: 6.7 G/DL (ref 6–8.5)
RBC # BLD AUTO: 4.14 10*6/MM3 (ref 3.77–5.28)
SODIUM SERPL-SCNC: 141 MMOL/L (ref 136–145)
T4 FREE SERPL-MCNC: 1.22 NG/DL (ref 0.93–1.7)
TRIGL SERPL-MCNC: 43 MG/DL (ref 0–150)
TSH SERPL DL<=0.005 MIU/L-ACNC: 1.3 UIU/ML (ref 0.27–4.2)
VLDLC SERPL CALC-MCNC: 11 MG/DL (ref 5–40)
WBC # BLD AUTO: 3.09 10*3/MM3 (ref 3.4–10.8)

## 2021-07-15 ENCOUNTER — TRANSCRIBE ORDERS (OUTPATIENT)
Dept: LAB | Facility: HOSPITAL | Age: 56
End: 2021-07-15

## 2021-07-15 DIAGNOSIS — Z80.3 FAMILY HISTORY OF BREAST CANCER: Primary | ICD-10-CM

## 2021-07-26 ENCOUNTER — HOSPITAL ENCOUNTER (OUTPATIENT)
Dept: MAMMOGRAPHY | Facility: HOSPITAL | Age: 56
Discharge: HOME | End: 2021-07-26
Admitting: OBSTETRICS & GYNECOLOGY

## 2021-07-26 ENCOUNTER — LAB (OUTPATIENT)
Dept: LAB | Facility: HOSPITAL | Age: 56
End: 2021-07-26

## 2021-07-26 DIAGNOSIS — Z12.31 SCREENING MAMMOGRAM, ENCOUNTER FOR: ICD-10-CM

## 2021-07-26 PROCEDURE — 77063 BREAST TOMOSYNTHESIS BI: CPT

## 2021-07-26 PROCEDURE — 77067 SCR MAMMO BI INCL CAD: CPT

## 2021-07-31 DIAGNOSIS — L68.0 HIRSUTISM: ICD-10-CM

## 2021-08-04 RX ORDER — SPIRONOLACTONE 100 MG/1
TABLET, FILM COATED ORAL
Qty: 90 TABLET | Refills: 7 | Status: SHIPPED | OUTPATIENT
Start: 2021-08-04 | End: 2022-07-25 | Stop reason: SDUPTHER

## 2021-08-10 DIAGNOSIS — Z80.42 FAMILY HISTORY OF PROSTATE CANCER: Primary | ICD-10-CM

## 2021-08-10 DIAGNOSIS — Z80.0 FAMILY HISTORY OF PANCREATIC CANCER: ICD-10-CM

## 2022-04-26 RX ORDER — ESTRADIOL 1 MG/1
TABLET ORAL
Qty: 135 TABLET | Refills: 0 | Status: SHIPPED | OUTPATIENT
Start: 2022-04-26 | End: 2022-07-25

## 2022-06-30 ENCOUNTER — OFFICE VISIT (OUTPATIENT)
Dept: OBSTETRICS AND GYNECOLOGY | Facility: CLINIC | Age: 57
End: 2022-06-30

## 2022-06-30 VITALS
WEIGHT: 137 LBS | DIASTOLIC BLOOD PRESSURE: 76 MMHG | HEIGHT: 68 IN | BODY MASS INDEX: 20.76 KG/M2 | SYSTOLIC BLOOD PRESSURE: 112 MMHG

## 2022-06-30 DIAGNOSIS — Z12.31 ENCOUNTER FOR SCREENING MAMMOGRAM FOR BREAST CANCER: ICD-10-CM

## 2022-06-30 DIAGNOSIS — Z13.820 ENCOUNTER FOR SCREENING FOR OSTEOPOROSIS: ICD-10-CM

## 2022-06-30 DIAGNOSIS — R35.0 URINARY FREQUENCY: Primary | ICD-10-CM

## 2022-06-30 DIAGNOSIS — F41.9 ANXIETY: ICD-10-CM

## 2022-06-30 PROCEDURE — 99213 OFFICE O/P EST LOW 20 MIN: CPT | Performed by: NURSE PRACTITIONER

## 2022-06-30 RX ORDER — NITROFURANTOIN 25; 75 MG/1; MG/1
100 CAPSULE ORAL EVERY 12 HOURS SCHEDULED
Qty: 14 CAPSULE | Refills: 0 | Status: SHIPPED | OUTPATIENT
Start: 2022-06-30 | End: 2022-07-25

## 2022-06-30 RX ORDER — ALPRAZOLAM 0.5 MG/1
0.25 TABLET ORAL 2 TIMES DAILY PRN
Qty: 60 TABLET | Refills: 0 | Status: SHIPPED | OUTPATIENT
Start: 2022-06-30

## 2022-06-30 NOTE — PROGRESS NOTES
"Martine Severino is a 56 y.o. female    Patient comes into today for complaint of UTI.  States she is having burning with urination.  She does have history of kidney stones.  She has not seen any blood in her urine.    Urinary Tract Infection   This is a new problem. The current episode started yesterday. The quality of the pain is described as aching and burning. The patient is experiencing no pain. There has been no fever. She is sexually active. There is no history of pyelonephritis. Associated symptoms include frequency. Pertinent negatives include no chills, discharge, flank pain, hematuria, hesitancy, nausea, possible pregnancy, sweats, urgency or vomiting. She has tried nothing for the symptoms.         /76   Ht 172.7 cm (67.99\")   Wt 62.1 kg (137 lb)   LMP 11/27/2009 (Approximate)   BMI 20.84 kg/m²     Outpatient Encounter Medications as of 6/30/2022   Medication Sig Dispense Refill   • ALPRAZolam (XANAX) 0.5 MG tablet Take 0.5 tablets by mouth 2 (Two) Times a Day As Needed for Anxiety. 60 tablet 0   • cetirizine (zyrTEC) 10 MG tablet Take 10 mg by mouth Daily.     • estradiol (ESTRACE) 1 MG tablet TAKE 1 AND 1/2 TABLETS BY MOUTH EVERY DAY 45 tablet 0   • estradiol (ESTRACE) 1 MG tablet TAKE 1.5 TABLETS BY MOUTH DAILY FOR 90 DAYS. 135 tablet 0   • magnesium gluconate (MAGONATE) 500 MG tablet Take 27 mg by mouth 2 (Two) Times a Day.     • spironolactone (ALDACTONE) 100 MG tablet TAKE 1 TABLET BY MOUTH EVERY DAY 90 tablet 7   • [DISCONTINUED] ALPRAZolam (XANAX) 0.5 MG tablet Take 0.5 tablets by mouth 2 (Two) Times a Day As Needed for Anxiety. 20 tablet 0   • nitrofurantoin, macrocrystal-monohydrate, (Macrobid) 100 MG capsule Take 1 capsule by mouth Every 12 (Twelve) Hours. 14 capsule 0   • [DISCONTINUED] calcium carbonate (OS-NIKKO) 600 MG tablet Take 600 mg by mouth Daily.       No facility-administered encounter medications on file as of 6/30/2022.       Surgical History  Past Surgical " History:   Procedure Laterality Date   • CHOLECYSTECTOMY     • COLONOSCOPY N/A 3/2/2017    Procedure: COLONOSCOPY WITH ANESTHESIA;  Surgeon: Jennifer Gallegos MD;  Location: [facility] ENDOSCOPY;  Service:    • DILATATION AND CURETTAGE     • HYSTERECTOMY      without BSO       Family History  Family History   Problem Relation Age of Onset   • Coronary artery disease Father    • Colon polyps Father    • Hypertension Mother    • Aortic aneurysm Mother    • No Known Problems Brother    • No Known Problems Sister    • Breast cancer Maternal Aunt 60   • Breast cancer Cousin 40   • No Known Problems Daughter    • No Known Problems Son    • No Known Problems Maternal Grandmother    • No Known Problems Paternal Grandmother    • No Known Problems Paternal Aunt    • No Known Problems Other    • Ovarian cancer Neg Hx    • Uterine cancer Neg Hx    • Colon cancer Neg Hx    • Melanoma Neg Hx    • Prostate cancer Neg Hx    • Crohn's disease Neg Hx    • Esophageal cancer Neg Hx    • Liver cancer Neg Hx    • Liver disease Neg Hx    • Rectal cancer Neg Hx    • Stomach cancer Neg Hx    • BRCA 1/2 Neg Hx    • Endometrial cancer Neg Hx        The following portions of the patient's history were reviewed and updated as appropriate: allergies, current medications, past family history, past medical history, past social history, past surgical history, and problem list.    Review of Systems   Constitutional: Negative for chills and fever.   Gastrointestinal: Negative for nausea and vomiting.   Genitourinary: Positive for dysuria and frequency. Negative for flank pain, hematuria, hesitancy and urgency.       Objective   Physical Exam  Vitals and nursing note reviewed.   Constitutional:       Appearance: She is well-developed.   HENT:      Head: Normocephalic and atraumatic.   Eyes:      General:         Right eye: No discharge.         Left eye: No discharge.      Conjunctiva/sclera: Conjunctivae normal.   Neck:      Thyroid: No thyromegaly.    Cardiovascular:      Rate and Rhythm: Normal rate and regular rhythm.      Heart sounds: Normal heart sounds.   Pulmonary:      Effort: Pulmonary effort is normal.      Breath sounds: Normal breath sounds.   Musculoskeletal:         General: Normal range of motion.      Cervical back: Normal range of motion and neck supple.   Skin:     General: Skin is warm and dry.   Neurological:      Mental Status: She is alert and oriented to person, place, and time.   Psychiatric:         Mood and Affect: Mood normal.         Behavior: Behavior normal.         Thought Content: Thought content normal.         Judgment: Judgment normal.         Assessment & Plan   Diagnoses and all orders for this visit:    1. Urinary frequency (Primary)  Comments:  Patient complains of UTI.  Urine is sent for culture.  She is given Macrobid.  Orders:  -     Urine Culture - , Urine, Clean Catch  -     UA / M With / Rflx Culture([facility] ONLY) - Urine, Clean Catch  -     nitrofurantoin, macrocrystal-monohydrate, (Macrobid) 100 MG capsule; Take 1 capsule by mouth Every 12 (Twelve) Hours.  Dispense: 14 capsule; Refill: 0    2. Anxiety  Comments:  She requests a refill of Xanax.  States that she takes it only occasionally.  Rashawn is reviewed.  Orders:  -     ALPRAZolam (XANAX) 0.5 MG tablet; Take 0.5 tablets by mouth 2 (Two) Times a Day As Needed for Anxiety.  Dispense: 60 tablet; Refill: 0    3. Encounter for screening mammogram for breast cancer  Comments:  Patient will have a mammogram at [facility].  Orders:  -     Mammo Screening Digital Tomosynthesis Bilateral With CAD; Future    4. Encounter for screening for osteoporosis  Comments:  She will have a bone density at [facility].  She already has her yearly checkup scheduled.  Orders:  -     DEXA Bone Density Axial; Future         BMI is within normal parameters. No other follow-up for BMI required.      Vani Velez, APRN  6/30/2022

## 2022-07-07 LAB
APPEARANCE UR: CLEAR
BACTERIA #/AREA URNS HPF: NORMAL /HPF
BACTERIA UR CULT: ABNORMAL
BACTERIA UR CULT: ABNORMAL
BILIRUB UR QL STRIP: NEGATIVE
CASTS URNS QL MICRO: NORMAL /LPF
COLOR UR: YELLOW
EPI CELLS #/AREA URNS HPF: NORMAL /HPF (ref 0–10)
GLUCOSE UR QL STRIP: NEGATIVE
HGB UR QL STRIP: NEGATIVE
KETONES UR QL STRIP: NEGATIVE
LEUKOCYTE ESTERASE UR QL STRIP: NEGATIVE
MICRO URNS: NORMAL
MICRO URNS: NORMAL
NITRITE UR QL STRIP: NEGATIVE
OTHER ANTIBIOTIC SUSC ISLT: ABNORMAL
PH UR STRIP: 7 [PH] (ref 5–7.5)
PROT UR QL STRIP: NEGATIVE
RBC #/AREA URNS HPF: NORMAL /HPF (ref 0–2)
SP GR UR STRIP: 1 (ref 1–1.03)
URINALYSIS REFLEX: NORMAL
UROBILINOGEN UR STRIP-MCNC: 0.2 MG/DL (ref 0.2–1)
WBC #/AREA URNS HPF: NORMAL /HPF (ref 0–5)

## 2022-07-20 RX ORDER — CIPROFLOXACIN 500 MG/1
500 TABLET, FILM COATED ORAL EVERY 12 HOURS SCHEDULED
Qty: 14 TABLET | Refills: 0 | Status: SHIPPED | OUTPATIENT
Start: 2022-07-20

## 2022-07-25 ENCOUNTER — OFFICE VISIT (OUTPATIENT)
Dept: OBSTETRICS AND GYNECOLOGY | Facility: CLINIC | Age: 57
End: 2022-07-25

## 2022-07-25 VITALS
BODY MASS INDEX: 21.07 KG/M2 | DIASTOLIC BLOOD PRESSURE: 70 MMHG | SYSTOLIC BLOOD PRESSURE: 122 MMHG | WEIGHT: 139 LBS | HEIGHT: 68 IN

## 2022-07-25 DIAGNOSIS — L68.0 HIRSUTISM: ICD-10-CM

## 2022-07-25 DIAGNOSIS — N39.3 SUI (STRESS URINARY INCONTINENCE, FEMALE): ICD-10-CM

## 2022-07-25 DIAGNOSIS — K59.00 CONSTIPATION, UNSPECIFIED CONSTIPATION TYPE: ICD-10-CM

## 2022-07-25 DIAGNOSIS — N89.8 VAGINAL DRYNESS: ICD-10-CM

## 2022-07-25 DIAGNOSIS — Z13.820 ENCOUNTER FOR SCREENING FOR OSTEOPOROSIS: ICD-10-CM

## 2022-07-25 DIAGNOSIS — N95.1 MENOPAUSAL SYMPTOMS: ICD-10-CM

## 2022-07-25 DIAGNOSIS — Z01.419 WELL WOMAN EXAM WITH ROUTINE GYNECOLOGICAL EXAM: Primary | ICD-10-CM

## 2022-07-25 DIAGNOSIS — Z12.31 ENCOUNTER FOR SCREENING MAMMOGRAM FOR BREAST CANCER: ICD-10-CM

## 2022-07-25 DIAGNOSIS — Z80.3 FAMILY HISTORY OF BREAST CANCER: ICD-10-CM

## 2022-07-25 DIAGNOSIS — R35.0 URINARY FREQUENCY: ICD-10-CM

## 2022-07-25 PROCEDURE — 99213 OFFICE O/P EST LOW 20 MIN: CPT | Performed by: NURSE PRACTITIONER

## 2022-07-25 PROCEDURE — 99396 PREV VISIT EST AGE 40-64: CPT | Performed by: NURSE PRACTITIONER

## 2022-07-25 RX ORDER — ESTRADIOL 1 MG/1
1.5 TABLET ORAL DAILY
Qty: 135 TABLET | Refills: 3 | Status: SHIPPED | OUTPATIENT
Start: 2022-07-25

## 2022-07-25 RX ORDER — SPIRONOLACTONE 100 MG/1
100 TABLET, FILM COATED ORAL DAILY
Qty: 90 TABLET | Refills: 3 | Status: SHIPPED | OUTPATIENT
Start: 2022-07-25

## 2022-07-25 NOTE — PROGRESS NOTES
"Martine Severino is a 56 y.o. female    Patient comes in today for yearly checkup.  She is still on Cipro for UTI.  She is still having some urinary frequency.  She is also having NEMESIO that has been getting progressively worse.    Gynecologic Exam  The patient's pertinent negatives include no pelvic pain, vaginal bleeding or vaginal discharge. The patient is experiencing no pain. She is not pregnant. Associated symptoms include constipation, frequency and urgency. Pertinent negatives include no abdominal pain, anorexia, back pain, chills, diarrhea, discolored urine, dysuria, fever, flank pain, headaches, hematuria, joint pain, joint swelling, nausea, painful intercourse, rash, sore throat or vomiting. Nothing aggravates the symptoms. She has tried antibiotics for the symptoms. The treatment provided mild relief. She is sexually active. She uses hysterectomy for contraception. She is postmenopausal.         /70   Ht 172.7 cm (67.99\")   Wt 63 kg (139 lb)   LMP 11/27/2009 (Approximate)   BMI 21.14 kg/m²     Outpatient Encounter Medications as of 7/25/2022   Medication Sig Dispense Refill   • ALPRAZolam (XANAX) 0.5 MG tablet Take 0.5 tablets by mouth 2 (Two) Times a Day As Needed for Anxiety. 60 tablet 0   • cetirizine (zyrTEC) 10 MG tablet Take 10 mg by mouth Daily.     • ciprofloxacin (Cipro) 500 MG tablet Take 1 tablet by mouth Every 12 (Twelve) Hours. 14 tablet 0   • estradiol (ESTRACE) 1 MG tablet Take 1.5 tablets by mouth Daily. 135 tablet 3   • magnesium gluconate (MAGONATE) 500 MG tablet Take 27 mg by mouth 2 (Two) Times a Day.     • spironolactone (ALDACTONE) 100 MG tablet Take 1 tablet by mouth Daily. 90 tablet 3   • [DISCONTINUED] estradiol (ESTRACE) 1 MG tablet TAKE 1 AND 1/2 TABLETS BY MOUTH EVERY DAY 45 tablet 0   • [DISCONTINUED] estradiol (ESTRACE) 1 MG tablet TAKE 1.5 TABLETS BY MOUTH DAILY FOR 90 DAYS. 135 tablet 0   • [DISCONTINUED] spironolactone (ALDACTONE) 100 MG tablet TAKE 1 " TABLET BY MOUTH EVERY DAY 90 tablet 7   • [DISCONTINUED] nitrofurantoin, macrocrystal-monohydrate, (Macrobid) 100 MG capsule Take 1 capsule by mouth Every 12 (Twelve) Hours. 14 capsule 0     No facility-administered encounter medications on file as of 7/25/2022.       Surgical History  Past Surgical History:   Procedure Laterality Date   • CHOLECYSTECTOMY     • COLONOSCOPY N/A 3/2/2017    Procedure: COLONOSCOPY WITH ANESTHESIA;  Surgeon: Jennifer Gallegos MD;  Location: [facility] ENDOSCOPY;  Service:    • DILATATION AND CURETTAGE     • HYSTERECTOMY      without BSO       Family History  Family History   Problem Relation Age of Onset   • Coronary artery disease Father    • Colon polyps Father    • Hypertension Mother    • Aortic aneurysm Mother    • No Known Problems Brother    • No Known Problems Sister    • Breast cancer Maternal Aunt 60   • Breast cancer Cousin 40   • No Known Problems Daughter    • No Known Problems Son    • No Known Problems Maternal Grandmother    • No Known Problems Paternal Grandmother    • No Known Problems Paternal Aunt    • No Known Problems Other    • Ovarian cancer Neg Hx    • Uterine cancer Neg Hx    • Colon cancer Neg Hx    • Melanoma Neg Hx    • Prostate cancer Neg Hx    • Crohn's disease Neg Hx    • Esophageal cancer Neg Hx    • Liver cancer Neg Hx    • Liver disease Neg Hx    • Rectal cancer Neg Hx    • Stomach cancer Neg Hx    • BRCA 1/2 Neg Hx    • Endometrial cancer Neg Hx        The following portions of the patient's history were reviewed and updated as appropriate: allergies, current medications, past family history, past medical history, past social history, past surgical history, and problem list.    Review of Systems   Constitutional: Negative for activity change, appetite change, chills, diaphoresis, fatigue, fever, unexpected weight gain and unexpected weight loss.   HENT: Negative for congestion, dental problem, drooling, ear discharge, ear pain, facial swelling, hearing loss,  mouth sores, nosebleeds, postnasal drip, rhinorrhea, sinus pressure, sneezing, sore throat, swollen glands, tinnitus, trouble swallowing and voice change.    Eyes: Negative for blurred vision, double vision, photophobia, pain, discharge, redness, itching and visual disturbance.   Respiratory: Negative for apnea, cough, choking, chest tightness, shortness of breath, wheezing and stridor.    Cardiovascular: Negative for chest pain, palpitations and leg swelling.   Gastrointestinal: Positive for constipation. Negative for abdominal distention, abdominal pain, anal bleeding, anorexia, blood in stool, diarrhea, nausea, rectal pain, vomiting, GERD and indigestion.   Endocrine: Negative for cold intolerance, heat intolerance, polydipsia, polyphagia and polyuria.   Genitourinary: Positive for frequency, urgency and urinary incontinence. Negative for amenorrhea, breast discharge, breast lump, breast pain, decreased libido, decreased urine volume, difficulty urinating, dyspareunia, dysuria, flank pain, genital sores, hematuria, menstrual problem, pelvic pain, pelvic pressure, vaginal bleeding, vaginal discharge and vaginal pain.   Musculoskeletal: Negative for arthralgias, back pain, gait problem, joint pain, joint swelling, myalgias, neck pain, neck stiffness and bursitis.   Skin: Negative for color change, dry skin and rash.   Allergic/Immunologic: Negative for environmental allergies, food allergies and immunocompromised state.   Neurological: Negative for dizziness, tremors, seizures, syncope, facial asymmetry, speech difficulty, weakness, light-headedness, numbness, headache, memory problem and confusion.   Hematological: Negative for adenopathy. Does not bruise/bleed easily.   Psychiatric/Behavioral: Negative for agitation, behavioral problems, decreased concentration, dysphoric mood, hallucinations, self-injury, sleep disturbance, suicidal ideas, negative for hyperactivity, depressed mood and stress. The patient is not  nervous/anxious.        Objective   Physical Exam  Vitals and nursing note reviewed. Exam conducted with a chaperone present.   Constitutional:       General: She is not in acute distress.     Appearance: She is well-developed. She is not diaphoretic.   HENT:      Head: Normocephalic.      Right Ear: External ear normal.      Left Ear: External ear normal.      Nose: Nose normal.   Eyes:      General: No scleral icterus.        Right eye: No discharge.         Left eye: No discharge.      Conjunctiva/sclera: Conjunctivae normal.      Pupils: Pupils are equal, round, and reactive to light.   Neck:      Thyroid: No thyromegaly.      Vascular: No carotid bruit.      Trachea: No tracheal deviation.   Cardiovascular:      Rate and Rhythm: Normal rate and regular rhythm.      Heart sounds: Normal heart sounds. No murmur heard.  Pulmonary:      Effort: Pulmonary effort is normal. No respiratory distress.      Breath sounds: Normal breath sounds. No wheezing.   Chest:   Breasts: Breasts are symmetrical.      Right: Normal. No swelling, bleeding, inverted nipple, mass, nipple discharge, skin change, tenderness, axillary adenopathy or supraclavicular adenopathy.      Left: Normal. No swelling, bleeding, inverted nipple, mass, nipple discharge, skin change, tenderness, axillary adenopathy or supraclavicular adenopathy.       Abdominal:      General: There is no distension.      Palpations: Abdomen is soft. There is no mass.      Tenderness: There is no abdominal tenderness. There is no right CVA tenderness, left CVA tenderness or guarding.      Hernia: No hernia is present. There is no hernia in the left inguinal area or right inguinal area.   Genitourinary:     General: Normal vulva.      Exam position: Lithotomy position.      Labia:         Right: No rash, tenderness, lesion or injury.         Left: No rash, tenderness, lesion or injury.       Vagina: Normal. No signs of injury and foreign body. No vaginal discharge,  erythema, tenderness or bleeding.      Adnexa: Right adnexa normal and left adnexa normal.        Right: No mass, tenderness or fullness.          Left: No mass, tenderness or fullness.        Rectum: Normal. No mass.      Comments: Cervix, uterus, are surgically absent.    BSU normal  Urethral meatus  Normal  Perineum  Normal  Musculoskeletal:         General: No tenderness. Normal range of motion.      Cervical back: Normal range of motion and neck supple.   Lymphadenopathy:      Head:      Right side of head: No submental, submandibular, tonsillar, preauricular, posterior auricular or occipital adenopathy.      Left side of head: No submental, submandibular, tonsillar, preauricular, posterior auricular or occipital adenopathy.      Cervical: No cervical adenopathy.      Right cervical: No superficial, deep or posterior cervical adenopathy.     Left cervical: No superficial, deep or posterior cervical adenopathy.      Upper Body:      Right upper body: No supraclavicular, axillary or pectoral adenopathy.      Left upper body: No supraclavicular, axillary or pectoral adenopathy.      Lower Body: No right inguinal adenopathy. No left inguinal adenopathy.   Skin:     General: Skin is warm and dry.      Findings: No bruising, erythema or rash.   Neurological:      Mental Status: She is alert and oriented to person, place, and time.      Coordination: Coordination normal.   Psychiatric:         Mood and Affect: Mood normal.         Behavior: Behavior normal.         Thought Content: Thought content normal.         Judgment: Judgment normal.         Assessment & Plan   Diagnoses and all orders for this visit:    1. Well woman exam with routine gynecological exam (Primary)  Normal GYN exam. Will have lab work at PCP. Encouraged SBE, pt is aware how to do self breast exam and the importance of same. Discussed weight management and importance of maintaining a healthy weight. Discussed Vitamin D intake and the importance of  adequate vitamin D for both Bone Health and a healthy immune system.  Discussed Daily exercise and the importance of same, in regards to a healthy heart as well as helping to maintain her weight and improving her mental health.  BMI 21.1.  Colonoscopy is up to date.  Mammogram and bone density will be scheduled at [facility].  Pap smear is not done per [facility]P guidelines.    2. Encounter for screening mammogram for breast cancer  Comments:  Patient has her mammogram scheduled next month for [facility].    3. Urinary frequency  Comments:  Patient still having some urinary frequency.  She took Macrobid and that did not work, so then she started Cipro.  We had done a culture and it shows that the Macrobid should have worked.  She will return for another urinalysis after she finishes the Cipro.  Orders:  -     Urine Culture - , Urine, Clean Catch  -     UA / M With / Rflx Culture([facility] ONLY) - Urine, Clean Catch    4. Encounter for screening for osteoporosis  Comments:  Patient has her bone density scheduled next month at [facility].    5. Family history of breast cancer  Comments:  Patient has family history of breast cancer.  She has had genetic screening done.    6. Hirsutism  Comments:  Doing well with Aldactone.  Orders:  -     spironolactone (ALDACTONE) 100 MG tablet; Take 1 tablet by mouth Daily.  Dispense: 90 tablet; Refill: 3    7. NEMESIO (stress urinary incontinence, female)  Comments:  She is having significant NEMESIO.  We discussed Kegel exercises.  We also discussed pelvic floor physical therapy and Gilda Ramírez touch.  She will call if she desire    8. Vaginal dryness  Comments:  Patient has noted some vaginal dryness.  She is encouraged to use coconut oil if needed.    9. Constipation, unspecified constipation type  Comments:  Patient has constipation off and on.  She is encouraged to increase her water intake, try MiraLAX or Senokot.    10. Menopausal symptoms  Comments:  Patient is doing well with Estrace  1-1/2 mg daily.  She is given a refill on same.  Orders:  -     estradiol (ESTRACE) 1 MG tablet; Take 1.5 tablets by mouth Daily.  Dispense: 135 tablet; Refill: 3         BMI is within normal parameters. No other follow-up for BMI required.      Vani Velez, APRN  7/25/2022

## 2022-07-27 ENCOUNTER — HOSPITAL ENCOUNTER (OUTPATIENT)
Dept: BONE DENSITY | Facility: HOSPITAL | Age: 57
Discharge: HOME | End: 2022-07-27

## 2022-07-27 ENCOUNTER — HOSPITAL ENCOUNTER (OUTPATIENT)
Dept: MAMMOGRAPHY | Facility: HOSPITAL | Age: 57
Discharge: HOME | End: 2022-07-27

## 2022-07-27 DIAGNOSIS — Z13.820 ENCOUNTER FOR SCREENING FOR OSTEOPOROSIS: ICD-10-CM

## 2022-07-27 DIAGNOSIS — Z12.31 ENCOUNTER FOR SCREENING MAMMOGRAM FOR BREAST CANCER: ICD-10-CM

## 2022-07-27 PROCEDURE — 77063 BREAST TOMOSYNTHESIS BI: CPT

## 2022-07-27 PROCEDURE — 77080 DXA BONE DENSITY AXIAL: CPT

## 2022-07-27 PROCEDURE — 77067 SCR MAMMO BI INCL CAD: CPT

## 2022-08-19 LAB
APPEARANCE UR: CLEAR
BACTERIA #/AREA URNS HPF: NORMAL /HPF
BACTERIA UR CULT: NO GROWTH
BACTERIA UR CULT: NORMAL
BILIRUB UR QL STRIP: NEGATIVE
CASTS URNS QL MICRO: NORMAL /LPF
COLOR UR: YELLOW
EPI CELLS #/AREA URNS HPF: NORMAL /HPF (ref 0–10)
GLUCOSE UR QL STRIP: NEGATIVE
HGB UR QL STRIP: NEGATIVE
KETONES UR QL STRIP: NEGATIVE
LEUKOCYTE ESTERASE UR QL STRIP: NEGATIVE
MICRO URNS: NORMAL
MICRO URNS: NORMAL
NITRITE UR QL STRIP: NEGATIVE
PH UR STRIP: 6.5 [PH] (ref 5–7.5)
PROT UR QL STRIP: NEGATIVE
RBC #/AREA URNS HPF: NORMAL /HPF (ref 0–2)
SP GR UR STRIP: 1.01 (ref 1–1.03)
URINALYSIS REFLEX: NORMAL
UROBILINOGEN UR STRIP-MCNC: 0.2 MG/DL (ref 0.2–1)
WBC #/AREA URNS HPF: NORMAL /HPF (ref 0–5)

## 2023-05-31 ENCOUNTER — TELEPHONE (OUTPATIENT)
Dept: OBSTETRICS AND GYNECOLOGY | Facility: CLINIC | Age: 58
End: 2023-05-31

## 2023-06-07 DIAGNOSIS — F41.9 ANXIETY: ICD-10-CM

## 2023-06-07 RX ORDER — ALPRAZOLAM 0.5 MG/1
0.25 TABLET ORAL 2 TIMES DAILY PRN
Qty: 60 TABLET | Refills: 0 | Status: CANCELLED | OUTPATIENT
Start: 2023-06-07

## 2023-06-07 NOTE — TELEPHONE ENCOUNTER
Contacted patient and informed her that she would have to be seen for refill of Xanax. Vani RAMIREZ had appointments available tomorrow, pt scheduled at 1130.

## 2023-06-07 NOTE — TELEPHONE ENCOUNTER
CARLOS RICHARDSON     [phone]    PT IS TRAVELING TO [place]. LAWS ARE ALITTLE STRICTER THE, SO SHE WOULD NEED A NEW PRESCRIPTION OF HER XANAX HER OTHER PRESCRIPTION IS OLD & SHE DOESN'T WANT ANY ISSUES TO ARISE WHILE TRAVELING     PHARMACY: [facility]

## 2023-06-08 ENCOUNTER — OFFICE VISIT (OUTPATIENT)
Dept: OBSTETRICS AND GYNECOLOGY | Facility: CLINIC | Age: 58
End: 2023-06-08
Payer: COMMERCIAL

## 2023-06-08 VITALS
HEIGHT: 68 IN | WEIGHT: 134 LBS | SYSTOLIC BLOOD PRESSURE: 128 MMHG | BODY MASS INDEX: 20.31 KG/M2 | DIASTOLIC BLOOD PRESSURE: 78 MMHG

## 2023-06-08 DIAGNOSIS — F41.9 ANXIETY: Primary | ICD-10-CM

## 2023-06-08 DIAGNOSIS — Z12.31 ENCOUNTER FOR SCREENING MAMMOGRAM FOR BREAST CANCER: ICD-10-CM

## 2023-06-08 DIAGNOSIS — Z82.49 FAMILY HISTORY OF AORTIC ANEURYSM: ICD-10-CM

## 2023-06-08 DIAGNOSIS — Z71.84 TRAVEL ADVICE ENCOUNTER: ICD-10-CM

## 2023-06-08 DIAGNOSIS — R35.0 URINARY FREQUENCY: ICD-10-CM

## 2023-06-08 PROCEDURE — 99213 OFFICE O/P EST LOW 20 MIN: CPT | Performed by: NURSE PRACTITIONER

## 2023-06-08 RX ORDER — NITROFURANTOIN 25; 75 MG/1; MG/1
100 CAPSULE ORAL EVERY 12 HOURS SCHEDULED
Qty: 14 CAPSULE | Refills: 0 | Status: SHIPPED | OUTPATIENT
Start: 2023-06-08

## 2023-06-08 RX ORDER — ALPRAZOLAM 0.5 MG/1
0.25 TABLET ORAL 2 TIMES DAILY PRN
Qty: 60 TABLET | Refills: 0 | Status: SHIPPED | OUTPATIENT
Start: 2023-06-08

## 2023-06-08 RX ORDER — AMOXICILLIN 500 MG/1
500 TABLET, FILM COATED ORAL EVERY 8 HOURS SCHEDULED
Qty: 30 TABLET | Refills: 0 | Status: SHIPPED | OUTPATIENT
Start: 2023-06-08 | End: 2023-06-18

## 2023-06-08 RX ORDER — ONDANSETRON HYDROCHLORIDE 8 MG/1
8 TABLET, FILM COATED ORAL EVERY 8 HOURS PRN
Qty: 30 TABLET | Refills: 3 | Status: SHIPPED | OUTPATIENT
Start: 2023-06-08

## 2023-06-08 NOTE — PROGRESS NOTES
"Subjective     Rebeka Severino is a 57 y.o. female    History of Present Illness  Patient is here today for med refills.  She is doing well with Xanax and only takes it occasionally.  She needs Zofran and amoxicillin to take with her on a trip.  She also complains of urinary frequency.  And has noted some shortness of breath when walking.  Her mother  approximately 6 months ago of an aortic aneurysm.  So she would like to be evaluated.  Urinary Frequency   This is a new problem. Associated symptoms include frequency. Pertinent negatives include no chills, flank pain, hematuria, nausea, urgency or vomiting.       /78   Ht 172.7 cm (68\")   Wt 60.8 kg (134 lb)   LMP 2009 (Approximate)   BMI 20.37 kg/m²     Outpatient Encounter Medications as of 2023   Medication Sig Dispense Refill    ALPRAZolam (XANAX) 0.5 MG tablet Take 0.5 tablets by mouth 2 (Two) Times a Day As Needed for Anxiety. 60 tablet 0    cetirizine (zyrTEC) 10 MG tablet Take 1 tablet by mouth Daily.      ciprofloxacin (Cipro) 500 MG tablet Take 1 tablet by mouth Every 12 (Twelve) Hours. 14 tablet 0    estradiol (ESTRACE) 1 MG tablet Take 1.5 tablets by mouth Daily. 135 tablet 3    magnesium gluconate (MAGONATE) 500 MG tablet Take 1 tablet by mouth 2 (Two) Times a Day.      spironolactone (ALDACTONE) 100 MG tablet Take 1 tablet by mouth Daily. 90 tablet 3    [DISCONTINUED] ALPRAZolam (XANAX) 0.5 MG tablet Take 0.5 tablets by mouth 2 (Two) Times a Day As Needed for Anxiety. 60 tablet 0    amoxicillin (AMOXIL) 500 MG tablet Take 1 tablet by mouth Every 8 (Eight) Hours for 10 days. 30 tablet 0    nitrofurantoin, macrocrystal-monohydrate, (Macrobid) 100 MG capsule Take 1 capsule by mouth Every 12 (Twelve) Hours. 14 capsule 0    ondansetron (Zofran) 8 MG tablet Take 1 tablet by mouth Every 8 (Eight) Hours As Needed for Nausea or Vomiting. 30 tablet 3     No facility-administered encounter medications on file as of 2023.       Surgical " History  Past Surgical History:   Procedure Laterality Date    CHOLECYSTECTOMY      COLONOSCOPY N/A 03/02/2017    Procedure: COLONOSCOPY WITH ANESTHESIA;  Surgeon: Jennifer Gallegos MD;  Location: [facility] ENDOSCOPY;  Service:     DILATATION AND CURETTAGE      HYSTERECTOMY      without BSO       Family History  Family History   Problem Relation Age of Onset    Coronary artery disease Father     Colon polyps Father     Dementia Father     Hypertension Mother     Aortic aneurysm Mother     No Known Problems Brother     No Known Problems Sister     No Known Problems Son     No Known Problems Daughter     No Known Problems Paternal Grandmother     No Known Problems Maternal Grandmother     Breast cancer Maternal Aunt 60    No Known Problems Paternal Aunt     Breast cancer Cousin 40    No Known Problems Other     Ovarian cancer Neg Hx     Uterine cancer Neg Hx     Colon cancer Neg Hx     Melanoma Neg Hx     Prostate cancer Neg Hx     Crohn's disease Neg Hx     Esophageal cancer Neg Hx     Liver cancer Neg Hx     Liver disease Neg Hx     Rectal cancer Neg Hx     Stomach cancer Neg Hx     BRCA 1/2 Neg Hx     Endometrial cancer Neg Hx        The following portions of the patient's history were reviewed and updated as appropriate: allergies, current medications, past family history, past medical history, past social history, past surgical history, and problem list.    Review of Systems   Constitutional:  Negative for activity change, appetite change, chills, diaphoresis, fatigue, fever, unexpected weight gain and unexpected weight loss.   HENT:  Negative for congestion, dental problem, drooling, ear discharge, ear pain, facial swelling, hearing loss, mouth sores, nosebleeds, postnasal drip, rhinorrhea, sinus pressure, sneezing, sore throat, swollen glands, tinnitus, trouble swallowing and voice change.    Eyes:  Negative for blurred vision, double vision, photophobia, pain, discharge, redness, itching and visual disturbance.    Respiratory:  Positive for shortness of breath. Negative for apnea, cough, choking, chest tightness, wheezing and stridor.    Cardiovascular:  Negative for chest pain, palpitations and leg swelling.   Gastrointestinal:  Negative for abdominal distention, abdominal pain, anal bleeding, blood in stool, constipation, diarrhea, nausea, rectal pain, vomiting, GERD and indigestion.   Endocrine: Negative for cold intolerance, heat intolerance, polydipsia, polyphagia and polyuria.   Genitourinary:  Positive for frequency. Negative for amenorrhea, breast discharge, breast lump, breast pain, decreased libido, decreased urine volume, difficulty urinating, dyspareunia, dysuria, flank pain, genital sores, hematuria, menstrual problem, pelvic pain, pelvic pressure, urgency, urinary incontinence, vaginal bleeding, vaginal discharge and vaginal pain.   Musculoskeletal:  Negative for arthralgias, back pain, gait problem, joint swelling, myalgias, neck pain, neck stiffness and bursitis.   Skin:  Negative for color change, dry skin and rash.   Allergic/Immunologic: Negative for environmental allergies, food allergies and immunocompromised state.   Neurological:  Negative for dizziness, tremors, seizures, syncope, facial asymmetry, speech difficulty, weakness, light-headedness, numbness, headache, memory problem and confusion.   Hematological:  Negative for adenopathy. Does not bruise/bleed easily.   Psychiatric/Behavioral:  Negative for agitation, behavioral problems, decreased concentration, dysphoric mood, hallucinations, self-injury, sleep disturbance, suicidal ideas, negative for hyperactivity, depressed mood and stress. The patient is not nervous/anxious.      Objective   Physical Exam  Vitals and nursing note reviewed.   Constitutional:       Appearance: She is well-developed.   HENT:      Head: Normocephalic and atraumatic.   Eyes:      General:         Right eye: No discharge.         Left eye: No discharge.       Conjunctiva/sclera: Conjunctivae normal.   Neck:      Thyroid: No thyromegaly.   Cardiovascular:      Rate and Rhythm: Normal rate and regular rhythm.      Heart sounds: Normal heart sounds.   Pulmonary:      Effort: Pulmonary effort is normal.      Breath sounds: Normal breath sounds.   Musculoskeletal:         General: Normal range of motion.      Cervical back: Normal range of motion and neck supple.   Skin:     General: Skin is warm and dry.   Neurological:      Mental Status: She is alert and oriented to person, place, and time.   Psychiatric:         Mood and Affect: Mood normal.         Behavior: Behavior normal.         Thought Content: Thought content normal.         Judgment: Judgment normal.       Assessment & Plan   Diagnoses and all orders for this visit:    1. Anxiety (Primary)  Comments:  She requests a refill of Xanax.  States that she takes it only occasionally.  Rashawn is reviewed.  Orders:  -     ALPRAZolam (XANAX) 0.5 MG tablet; Take 0.5 tablets by mouth 2 (Two) Times a Day As Needed for Anxiety.  Dispense: 60 tablet; Refill: 0    2. Urinary frequency  Comments:  Complains of urinary frequency.  Urine is sent for culture.  She is given Macrobid today.  Orders:  -     nitrofurantoin, macrocrystal-monohydrate, (Macrobid) 100 MG capsule; Take 1 capsule by mouth Every 12 (Twelve) Hours.  Dispense: 14 capsule; Refill: 0  -     UA / M With / Rflx Culture([facility] ONLY) - Urine, Clean Catch  -     Urine Culture - , Urine, Clean Catch    3. Family history of aortic aneurysm  Comments:  Patient has family history of aortic aneurysm.  Her mom  approximately 6 months ago.  She has noticed some SOB while walking.  Like to see cardiology  Orders:  -     Ambulatory Referral to Cardiology    4. Encounter for screening mammogram for breast cancer  Comments:  Has a yearly scheduled for August.  Her mammogram orders placed today.  Orders:  -     Mammo Screening Digital Tomosynthesis Bilateral With CAD;  Future    5. Travel advice encounter  Comments:  Patient is traveling to a foreign country and would like Zofran and amoxicillin to take with her.  Orders:  -     amoxicillin (AMOXIL) 500 MG tablet; Take 1 tablet by mouth Every 8 (Eight) Hours for 10 days.  Dispense: 30 tablet; Refill: 0  -     ondansetron (Zofran) 8 MG tablet; Take 1 tablet by mouth Every 8 (Eight) Hours As Needed for Nausea or Vomiting.  Dispense: 30 tablet; Refill: 3         BMI is within normal parameters. No other follow-up for BMI required.      Vani Velez, APRN  6/8/2023

## 2023-06-10 LAB
APPEARANCE UR: CLEAR
BACTERIA #/AREA URNS HPF: NORMAL /HPF
BACTERIA UR CULT: NO GROWTH
BACTERIA UR CULT: NORMAL
BILIRUB UR QL STRIP: NEGATIVE
CASTS URNS QL MICRO: NORMAL /LPF
COLOR UR: YELLOW
EPI CELLS #/AREA URNS HPF: NORMAL /HPF (ref 0–10)
GLUCOSE UR QL STRIP: NEGATIVE
HGB UR QL STRIP: NEGATIVE
KETONES UR QL STRIP: NEGATIVE
LEUKOCYTE ESTERASE UR QL STRIP: NEGATIVE
MICRO URNS: NORMAL
MICRO URNS: NORMAL
NITRITE UR QL STRIP: NEGATIVE
PH UR STRIP: 7.5 [PH] (ref 5–7.5)
PROT UR QL STRIP: NEGATIVE
RBC #/AREA URNS HPF: NORMAL /HPF (ref 0–2)
SP GR UR STRIP: 1.01 (ref 1–1.03)
URINALYSIS REFLEX: NORMAL
UROBILINOGEN UR STRIP-MCNC: 0.2 MG/DL (ref 0.2–1)
WBC #/AREA URNS HPF: NORMAL /HPF (ref 0–5)

## 2023-07-10 NOTE — TELEPHONE ENCOUNTER
Caller: CARLOS RICHARDSON        Best call back number: [phone]    Patient is needing:   PATIENT WOULD LIKE TO SEE CARDIOLOGIST. HER MOTHER  FROM AN AORTIC ANEURYSM AND SHE WOULD LIKE A WORK UP. THE DR SHE WANTS TO SEE REQUESTED A REFERRAL AND SAID ONE FROM THE GYNECOLOGIST WOULD BE FINE.    DR. YAMINI HERNANDEZ IS THE DR SHE WOULD LIKE TO SEE  [phone]   FAX: [phone]      PLEASE ADVISE PATIENT IF THIS REFERRAL IS POSSIBLE AT NUMBER LISTED ABOVE. FINE WITH CALL BACK AT ANYTIME, FINE WITH LVM.         THUAN, patient's mother, Sanchez Roblero returned the call and stated that Ariel Billings is not taking his Levemir. He is also not using the [place] CGM. Sanchez Roblero does not live with him therefore, she is unable to get him to use either. She is not sure what to do for him next.

## 2023-07-27 DIAGNOSIS — L68.0 HIRSUTISM: ICD-10-CM

## 2023-07-27 RX ORDER — SPIRONOLACTONE 100 MG/1
TABLET, FILM COATED ORAL
Qty: 90 TABLET | Refills: 0 | Status: SHIPPED | OUTPATIENT
Start: 2023-07-27

## 2023-08-04 ENCOUNTER — OFFICE VISIT (OUTPATIENT)
Dept: CARDIOLOGY | Facility: CLINIC | Age: 58
End: 2023-08-04
Payer: COMMERCIAL

## 2023-08-04 VITALS
SYSTOLIC BLOOD PRESSURE: 140 MMHG | WEIGHT: 140 LBS | HEIGHT: 68 IN | HEART RATE: 67 BPM | BODY MASS INDEX: 21.22 KG/M2 | DIASTOLIC BLOOD PRESSURE: 78 MMHG | OXYGEN SATURATION: 99 %

## 2023-08-04 DIAGNOSIS — R06.02 SOB (SHORTNESS OF BREATH): Primary | ICD-10-CM

## 2023-08-04 DIAGNOSIS — Z82.49 FAMILY HISTORY OF AORTIC ANEURYSM: ICD-10-CM

## 2023-08-04 PROCEDURE — 99204 OFFICE O/P NEW MOD 45 MIN: CPT | Performed by: INTERNAL MEDICINE

## 2023-08-04 PROCEDURE — 93000 ELECTROCARDIOGRAM COMPLETE: CPT | Performed by: INTERNAL MEDICINE

## 2023-08-09 LAB
NCCN CRITERIA FLAG: ABNORMAL
TYRER CUZICK SCORE: 16.1

## 2023-08-14 ENCOUNTER — HOSPITAL ENCOUNTER (OUTPATIENT)
Dept: MAMMOGRAPHY | Facility: HOSPITAL | Age: 58
Discharge: HOME | End: 2023-08-14
Admitting: NURSE PRACTITIONER
Payer: COMMERCIAL

## 2023-08-14 DIAGNOSIS — Z12.31 ENCOUNTER FOR SCREENING MAMMOGRAM FOR BREAST CANCER: ICD-10-CM

## 2023-08-14 PROCEDURE — 77067 SCR MAMMO BI INCL CAD: CPT

## 2023-08-14 PROCEDURE — 77063 BREAST TOMOSYNTHESIS BI: CPT

## 2023-09-12 ENCOUNTER — OFFICE VISIT (OUTPATIENT)
Dept: OBSTETRICS AND GYNECOLOGY | Facility: CLINIC | Age: 58
End: 2023-09-12
Payer: COMMERCIAL

## 2023-09-12 VITALS
HEIGHT: 68 IN | WEIGHT: 141 LBS | SYSTOLIC BLOOD PRESSURE: 112 MMHG | BODY MASS INDEX: 21.37 KG/M2 | DIASTOLIC BLOOD PRESSURE: 76 MMHG

## 2023-09-12 DIAGNOSIS — N95.1 MENOPAUSAL SYMPTOMS: ICD-10-CM

## 2023-09-12 DIAGNOSIS — Z12.31 ENCOUNTER FOR SCREENING MAMMOGRAM FOR BREAST CANCER: ICD-10-CM

## 2023-09-12 DIAGNOSIS — N89.8 VAGINAL DRYNESS: ICD-10-CM

## 2023-09-12 DIAGNOSIS — Z01.419 WELL WOMAN EXAM WITH ROUTINE GYNECOLOGICAL EXAM: Primary | ICD-10-CM

## 2023-09-12 DIAGNOSIS — F41.9 ANXIETY: ICD-10-CM

## 2023-09-12 DIAGNOSIS — L68.0 HIRSUTISM: ICD-10-CM

## 2023-09-12 DIAGNOSIS — N39.3 SUI (STRESS URINARY INCONTINENCE, FEMALE): ICD-10-CM

## 2023-09-12 DIAGNOSIS — E55.9 VITAMIN D DEFICIENCY: ICD-10-CM

## 2023-09-12 RX ORDER — ALPRAZOLAM 0.5 MG/1
0.25 TABLET ORAL 2 TIMES DAILY PRN
Qty: 60 TABLET | Refills: 0 | Status: SHIPPED | OUTPATIENT
Start: 2023-09-12

## 2023-09-12 RX ORDER — SPIRONOLACTONE 100 MG/1
100 TABLET, FILM COATED ORAL DAILY
Qty: 90 TABLET | Refills: 3 | Status: SHIPPED | OUTPATIENT
Start: 2023-09-12

## 2023-09-12 RX ORDER — ESTRADIOL 1 MG/1
1.5 TABLET ORAL DAILY
Qty: 135 TABLET | Refills: 3 | Status: SHIPPED | OUTPATIENT
Start: 2023-09-12

## 2023-09-12 NOTE — PATIENT INSTRUCTIONS
Health Maintenance, Female  Adopting a healthy lifestyle and getting preventive care are important in promoting health and wellness. Ask your health care provider about:  The right schedule for you to have regular tests and exams.  Things you can do on your own to prevent diseases and keep yourself healthy.  What should I know about diet, weight, and exercise?  Eat a healthy diet    Eat a diet that includes plenty of vegetables, fruits, low-fat dairy products, and lean protein.  Do not eat a lot of foods that are high in solid fats, added sugars, or sodium.    Maintain a healthy weight  Body mass index (BMI) is used to identify weight problems. It estimates body fat based on height and weight. Your health care provider can help determine your BMI and help you achieve or maintain a healthy weight.  Get regular exercise  Get regular exercise. This is one of the most important things you can do for your health. Most adults should:  Exercise for at least 150 minutes each week. The exercise should increase your heart rate and make you sweat (moderate-intensity exercise).  Do strengthening exercises at least twice a week. This is in addition to the moderate-intensity exercise.  Spend less time sitting. Even light physical activity can be beneficial.  Start  Vitamin D 5000IU per day. Vitamin D has been shown to improve your mood, help with fatigue and increase your immune system. A normal Vitamin D in women should be 50-70.  You should have your Vitamin D checked yearly  Watch cholesterol and blood lipids  Have your blood tested for lipids and cholesterol at 20 years of age, then have this test every 3 years.  Have your cholesterol levels checked more often if:  Your lipid or cholesterol levels are high.  You are older than 30 years of age.  You are at high risk for heart disease.  What should I know about cancer screening?  Depending on your health history and family history, you may need to have cancer screening at  various ages. This may include screening for:  Breast cancer.  Cervical cancer.  Colorectal cancer.  Skin cancer.  Lung cancer.  What should I know about heart disease, diabetes, and high blood pressure?  Blood pressure and heart disease  High blood pressure causes heart disease and increases the risk of stroke. This is more likely to develop in people who have high blood pressure readings, are of  descent, or are overweight.  Have your blood pressure checked:                  Every year.  Diabetes  Have regular diabetes screenings. This checks your fasting blood sugar level. Have the screening done:  Once every year after age 30 if you are at a normal weight and have a low risk for diabetes.  More often and at a younger age if you are overweight or have a high risk for diabetes.  What should I know about preventing infection?  Hepatitis B  If you have a higher risk for hepatitis B, you should be screened for this virus. Talk with your health care provider to find out if you are at risk for hepatitis B infection.  Hepatitis C  Testing is recommended for:  Everyone born from 1945 through 1965.  Anyone with known risk factors for hepatitis C.  Sexually transmitted infections (STIs)  Get screened for STIs, including gonorrhea and chlamydia, if:  You are sexually active and are younger than 24 years of age.  You are older than 24 years of age and your health care provider tells you that you are at risk for this type of infection.  Your sexual activity has changed since you were last screened, and you are at increased risk for chlamydia or gonorrhea. Ask your health care provider if you are at risk.  Ask your health care provider about whether you are at high risk for HIV. Your health care provider may recommend a prescription medicine to help prevent HIV infection. If you choose to take medicine to prevent HIV, you should first get tested for HIV. You should then be tested every 3 months for as long as you are  taking the medicine.  Pregnancy  If you are about to stop having your period (premenopausal) and you may become pregnant, seek counseling before you get pregnant.  Take 400 to 800 micrograms (mcg) of folic acid every day if you become pregnant.  Ask for birth control (contraception) if you want to prevent pregnancy.  Osteoporosis and menopause  Osteoporosis is a disease in which the bones lose minerals and strength with aging. This can result in bone fractures. If you are 55 years old or older, or if you are at risk for osteoporosis and fractures, ask your health care provider if you should:  Be screened for bone loss.  Take a calcium or vitamin D supplement to lower your risk of fractures.  Be given hormone replacement therapy (HRT) to treat symptoms of menopause.  Follow these instructions at home:  Lifestyle  Do not use any products that contain nicotine or tobacco, such as cigarettes, e-cigarettes, and chewing tobacco. If you need help quitting, ask your health care provider.  Do not use street drugs.  Do not share needles.  Ask your health care provider for help if you need support or information about quitting drugs.  Alcohol use  Do not drink alcohol if:  Your health care provider tells you not to drink.  You are pregnant, may be pregnant, or are planning to become pregnant.  If you drink alcohol:  Limit how much you use to 0-1 drink a day.  Limit intake if you are breastfeeding.  Be aware of how much alcohol is in your drink. In the [place] one drink equals one 12 oz bottle of beer (355 mL), one 5 oz glass of wine (148 mL), or one 1½ oz glass of hard liquor (44 mL).  General instructions  Schedule regular health, dental, and eye exams.  Stay current with your vaccines.  Tell your health care provider if:  You often feel depressed.  You have ever been abused or do not feel safe at home.  Summary  Adopting a healthy lifestyle and getting preventive care are important in promoting health and wellness.  Follow  your health care provider's instructions about healthy diet, exercising, and getting tested or screened for diseases.  Follow your health care provider's instructions on monitoring your cholesterol and blood pressure.  This information is not intended to replace advice given to you by your health care provider. Make sure you discuss any questions you have with your health care provider.  Document Revised: 12/11/2019 Document Reviewed: 12/11/2019  [employer] Patient Education © 2021 [employer] Kegel Exercises    Kegel exercises can help strengthen your pelvic floor muscles. The pelvic floor is a group of muscles that support your rectum, small intestine, and bladder. In females, pelvic floor muscles also help support the uterus. These muscles help you control the flow of urine and stool (feces).  Kegel exercises are painless and simple. They do not require any equipment. Your provider may suggest Kegel exercises to:  Improve bladder and bowel control.  Improve sexual response.  Improve weak pelvic floor muscles after surgery to remove the uterus (hysterectomy) or after pregnancy, in females.  Improve weak pelvic floor muscles after prostate gland removal or surgery, in males.  Kegel exercises involve squeezing your pelvic floor muscles. These are the same muscles you squeeze when you try to stop the flow of urine or keep from passing gas. The exercises can be done while sitting, standing, or lying down, but it is best to vary your position.  Ask your health care provider which exercises are safe for you. Do exercises exactly as told by your health care provider and adjust them as directed. Do not begin these exercises until told by your health care provider.  Exercises  How to do Kegel exercises:  Squeeze your pelvic floor muscles tight. You should feel a tight lift in your rectal area. If you are a female, you should also feel a tightness in your vaginal area. Keep your stomach, buttocks, and legs relaxed.  Hold  the muscles tight for up to 10 seconds.  Breathe normally.  Relax your muscles for up to 10 seconds.  Repeat as told by your health care provider.  Repeat this exercise daily as told by your health care provider. Continue to do this exercise for at least 4-6 weeks, or for as long as told by your health care provider.  You may be referred to a physical therapist who can help you learn more about how to do Kegel exercises.  Depending on your condition, your health care provider may recommend:  Varying how long you squeeze your muscles.  Doing several sets of exercises every day.  Doing exercises for several weeks.  Making Kegel exercises a part of your regular exercise routine.  This information is not intended to replace advice given to you by your health care provider. Make sure you discuss any questions you have with your health care provider.  Document Revised: 04/28/2022 Document Reviewed: 04/28/2022  [employer] Patient Education © 2023 [employer]

## 2023-09-12 NOTE — PROGRESS NOTES
"Martine Severino is a 58 y.o. female    History of Present Illness  Patient is here today for yearly checkup.  She has no complaints other than occasional NEMESIO.  Gynecologic Exam  The patient's pertinent negatives include no pelvic pain, vaginal bleeding or vaginal discharge. The patient is experiencing no pain. Pertinent negatives include no abdominal pain, anorexia, back pain, chills, constipation, diarrhea, discolored urine, dysuria, fever, flank pain, frequency, headaches, hematuria, joint pain, joint swelling, nausea, painful intercourse, rash, sore throat, urgency or vomiting. She is sexually active. She uses hysterectomy for contraception. She is postmenopausal.       /76   Ht 172.7 cm (67.99\")   Wt 64 kg (141 lb)   LMP 11/27/2009 (Approximate)   BMI 21.44 kg/m²     Outpatient Encounter Medications as of 9/12/2023   Medication Sig Dispense Refill    ALPRAZolam (XANAX) 0.5 MG tablet Take 0.5 tablets by mouth 2 (Two) Times a Day As Needed for Anxiety. 60 tablet 0    cetirizine (zyrTEC) 10 MG tablet Take 1 tablet by mouth Daily.      estradiol (ESTRACE) 1 MG tablet Take 1.5 tablets by mouth Daily. 135 tablet 3    magnesium gluconate (MAGONATE) 500 MG tablet Take 1 tablet by mouth 2 (Two) Times a Day.      spironolactone (ALDACTONE) 100 MG tablet Take 1 tablet by mouth Daily. 90 tablet 3    [DISCONTINUED] ALPRAZolam (XANAX) 0.5 MG tablet Take 0.5 tablets by mouth 2 (Two) Times a Day As Needed for Anxiety. 60 tablet 0    [DISCONTINUED] estradiol (ESTRACE) 1 MG tablet Take 1.5 tablets by mouth Daily. 135 tablet 3    [DISCONTINUED] spironolactone (ALDACTONE) 100 MG tablet TAKE 1 TABLET BY MOUTH EVERY DAY 90 tablet 0     No facility-administered encounter medications on file as of 9/12/2023.       Past Medical History  Past Medical History:   Diagnosis Date    Anxiety        Surgical History  Past Surgical History:   Procedure Laterality Date    CHOLECYSTECTOMY      COLONOSCOPY N/A 03/02/2017    " Procedure: COLONOSCOPY WITH ANESTHESIA;  Surgeon: Jennifer Gallegos MD;  Location: [facility] ENDOSCOPY;  Service:     DILATATION AND CURETTAGE      HYSTERECTOMY      without BSO       Family History  Family History   Problem Relation Age of Onset    Hypertension Mother     Aortic aneurysm Mother     Colon polyps Father     Dementia Father     No Known Problems Sister     No Known Problems Brother     No Known Problems Maternal Grandmother     No Known Problems Paternal Grandmother     No Known Problems Daughter     No Known Problems Son     Breast cancer Maternal Aunt 60    No Known Problems Paternal Aunt     Breast cancer Cousin 40    No Known Problems Other     Ovarian cancer Neg Hx     Uterine cancer Neg Hx     Colon cancer Neg Hx     Melanoma Neg Hx     Prostate cancer Neg Hx     Crohn's disease Neg Hx     Esophageal cancer Neg Hx     Liver cancer Neg Hx     Liver disease Neg Hx     Rectal cancer Neg Hx     Stomach cancer Neg Hx     BRCA 1/2 Neg Hx     Endometrial cancer Neg Hx        The following portions of the patient's history were reviewed and updated as appropriate: allergies, current medications, past family history, past medical history, past social history, past surgical history, and problem list.    Review of Systems   Constitutional:  Negative for activity change, appetite change, chills, diaphoresis, fatigue, fever, unexpected weight gain and unexpected weight loss.   HENT:  Negative for congestion, dental problem, drooling, ear discharge, ear pain, facial swelling, hearing loss, mouth sores, nosebleeds, postnasal drip, rhinorrhea, sinus pressure, sneezing, sore throat, swollen glands, tinnitus, trouble swallowing and voice change.    Eyes:  Negative for blurred vision, double vision, photophobia, pain, discharge, redness, itching and visual disturbance.   Respiratory:  Negative for apnea, cough, choking, chest tightness, shortness of breath, wheezing and stridor.    Cardiovascular:  Negative for chest  pain, palpitations and leg swelling.   Gastrointestinal:  Negative for abdominal distention, abdominal pain, anal bleeding, anorexia, blood in stool, constipation, diarrhea, nausea, rectal pain, vomiting, GERD and indigestion.   Endocrine: Negative for cold intolerance, heat intolerance, polydipsia, polyphagia and polyuria.   Genitourinary:  Positive for urinary incontinence. Negative for amenorrhea, breast discharge, breast lump, breast pain, decreased libido, decreased urine volume, difficulty urinating, dyspareunia, dysuria, flank pain, frequency, genital sores, hematuria, menstrual problem, pelvic pain, pelvic pressure, urgency, vaginal bleeding, vaginal discharge and vaginal pain.   Musculoskeletal:  Negative for arthralgias, back pain, gait problem, joint pain, joint swelling, myalgias, neck pain, neck stiffness and bursitis.   Skin:  Negative for color change, dry skin and rash.   Allergic/Immunologic: Negative for environmental allergies, food allergies and immunocompromised state.   Neurological:  Negative for dizziness, tremors, seizures, syncope, facial asymmetry, speech difficulty, weakness, light-headedness, numbness, headache, memory problem and confusion.   Hematological:  Negative for adenopathy. Does not bruise/bleed easily.   Psychiatric/Behavioral:  Negative for agitation, behavioral problems, decreased concentration, dysphoric mood, hallucinations, self-injury, sleep disturbance, suicidal ideas, negative for hyperactivity, depressed mood and stress. The patient is not nervous/anxious.      Objective   Physical Exam  Vitals and nursing note reviewed. Exam conducted with a chaperone present.   Constitutional:       General: She is not in acute distress.     Appearance: She is well-developed. She is not diaphoretic.   HENT:      Head: Normocephalic.      Right Ear: External ear normal.      Left Ear: External ear normal.      Nose: Nose normal.   Eyes:      General: No scleral icterus.        Right  eye: No discharge.         Left eye: No discharge.      Conjunctiva/sclera: Conjunctivae normal.      Pupils: Pupils are equal, round, and reactive to light.   Neck:      Thyroid: No thyromegaly.      Vascular: No carotid bruit.      Trachea: No tracheal deviation.   Cardiovascular:      Rate and Rhythm: Normal rate and regular rhythm.      Heart sounds: Normal heart sounds. No murmur heard.  Pulmonary:      Effort: Pulmonary effort is normal. No respiratory distress.      Breath sounds: Normal breath sounds. No wheezing.   Chest:   Breasts:     Breasts are symmetrical.      Right: Normal. No swelling, bleeding, inverted nipple, mass, nipple discharge, skin change or tenderness.      Left: Normal. No swelling, bleeding, inverted nipple, mass, nipple discharge, skin change or tenderness.   Abdominal:      General: There is no distension.      Palpations: Abdomen is soft. There is no mass.      Tenderness: There is no abdominal tenderness. There is no right CVA tenderness, left CVA tenderness or guarding.      Hernia: No hernia is present. There is no hernia in the left inguinal area or right inguinal area.   Genitourinary:     General: Normal vulva.      Exam position: Lithotomy position.      Labia:         Right: No rash, tenderness, lesion or injury.         Left: No rash, tenderness, lesion or injury.       Vagina: Normal. No signs of injury and foreign body. No vaginal discharge, erythema, tenderness or bleeding.      Uterus: Absent.       Adnexa: Right adnexa normal and left adnexa normal.        Right: No mass, tenderness or fullness.          Left: No mass, tenderness or fullness.        Rectum: Normal. No mass.      Comments: Cervix and uterus are surgically absent  BSU normal  Urethral meatus  Normal  Perineum  Normal  Musculoskeletal:         General: No tenderness. Normal range of motion.      Cervical back: Normal range of motion and neck supple.   Lymphadenopathy:      Head:      Right side of head: No  submental, submandibular, tonsillar, preauricular, posterior auricular or occipital adenopathy.      Left side of head: No submental, submandibular, tonsillar, preauricular, posterior auricular or occipital adenopathy.      Cervical: No cervical adenopathy.      Right cervical: No superficial, deep or posterior cervical adenopathy.     Left cervical: No superficial, deep or posterior cervical adenopathy.      Upper Body:      Right upper body: No supraclavicular, axillary or pectoral adenopathy.      Left upper body: No supraclavicular, axillary or pectoral adenopathy.      Lower Body: No right inguinal adenopathy. No left inguinal adenopathy.   Skin:     General: Skin is warm and dry.      Findings: No bruising, erythema or rash.   Neurological:      Mental Status: She is alert and oriented to person, place, and time.      Coordination: Coordination normal.   Psychiatric:         Mood and Affect: Mood normal.         Behavior: Behavior normal.         Thought Content: Thought content normal.         Judgment: Judgment normal.       PHQ-9 Depression Screening  Little interest or pleasure in doing things? 0-->not at all   Feeling down, depressed, or hopeless? 0-->not at all   Trouble falling or staying asleep, or sleeping too much?     Feeling tired or having little energy?     Poor appetite or overeating?     Feeling bad about yourself - or that you are a failure or have let yourself or your family down?     Trouble concentrating on things, such as reading the newspaper or watching television?     Moving or speaking so slowly that other people could have noticed? Or the opposite - being so fidgety or restless that you have been moving around a lot more than usual?     Thoughts that you would be better off dead, or of hurting yourself in some way?     PHQ-9 Total Score 0   If you checked off any problems, how difficult have these problems made it for you to do your work, take care of things at home, or get along with  other people?          Assessment & Plan   Diagnoses and all orders for this visit:    1. Well woman exam with routine gynecological exam (Primary)  Normal GYN exam. Will have lab work. Encouraged SBE, pt is aware how to do self breast exam and the importance of same. Discussed weight management and importance of maintaining a healthy weight. Discussed Vitamin D intake and the importance of adequate vitamin D for both Bone Health and a healthy immune system.  Discussed Daily exercise and the importance of same, in regards to a healthy heart as well as helping to maintain her weight and improving her mental health.  BMI 21.4.  Colonoscopy is up to date.  Mammogram was already done.  Pap smear is not done per [facility]CCP guidelines.        -     CBC & Differential  -     Comprehensive Metabolic Panel  -     Lipid Panel With LDL / HDL Ratio  -     TSH  -     T3, Uptake  -     T4, Free  -     Hemoglobin A1c  -     Urine Culture - , Urine, Clean Catch  -     UA / M With / Rflx Culture([facility] ONLY) - Urine, Clean Catch  -     Hepatitis C Antibody    2. Encounter for screening mammogram for breast cancer  Comments:  Patient has recently had a normal mammogram.    3. Anxiety  Comments:  She requests a refill of Xanax.  States that she takes it only occasionally.  Rashawn is reviewed.  Orders:  -     ALPRAZolam (XANAX) 0.5 MG tablet; Take 0.5 tablets by mouth 2 (Two) Times a Day As Needed for Anxiety.  Dispense: 60 tablet; Refill: 0    4. Hirsutism  Comments:  Doing well with Aldactone.  Orders:  -     spironolactone (ALDACTONE) 100 MG tablet; Take 1 tablet by mouth Daily.  Dispense: 90 tablet; Refill: 3    5. Menopausal symptoms  Comments:  Patient is doing well with Estrace 1-1/2 mg daily.  She is given a refill on same.  Orders:  -     estradiol (ESTRACE) 1 MG tablet; Take 1.5 tablets by mouth Daily.  Dispense: 135 tablet; Refill: 3    6. NEMESIO (stress urinary incontinence, female)  Comments:  She is having occasional NEMESIO.  We  discussed Kegel exercises we also discussed Gilda Ramírez.  She is given handouts on both of those.    7. Vaginal dryness  Comments:  Patient has some vaginal dryness.    8. Vitamin D deficiency  Comments:  Patient will return for blood work.  Orders:  -     Vitamin D,25-Hydroxy         BMI is within normal parameters. No other follow-up for BMI required.      Vani Velez, APRN  9/12/2023

## 2023-09-14 ENCOUNTER — HOSPITAL ENCOUNTER (OUTPATIENT)
Dept: CARDIOLOGY | Facility: HOSPITAL | Age: 58
Discharge: HOME | End: 2023-09-14
Admitting: INTERNAL MEDICINE
Payer: COMMERCIAL

## 2023-09-14 DIAGNOSIS — R06.02 SOB (SHORTNESS OF BREATH): ICD-10-CM

## 2023-09-14 PROCEDURE — 93306 TTE W/DOPPLER COMPLETE: CPT

## 2023-09-15 LAB
BH CV ECHO MEAS - AO MAX PG: 7 MMHG
BH CV ECHO MEAS - AO MEAN PG: 3 MMHG
BH CV ECHO MEAS - AO ROOT DIAM: 3.6 CM
BH CV ECHO MEAS - AO V2 MAX: 132 CM/SEC
BH CV ECHO MEAS - AO V2 VTI: 22.6 CM
BH CV ECHO MEAS - AVA(I,D): 2.9 CM2
BH CV ECHO MEAS - EDV(CUBED): 74.1 ML
BH CV ECHO MEAS - EDV(MOD-SP4): 73 ML
BH CV ECHO MEAS - EF(MOD-SP4): 66.2 %
BH CV ECHO MEAS - ESV(CUBED): 12.2 ML
BH CV ECHO MEAS - ESV(MOD-SP4): 24.7 ML
BH CV ECHO MEAS - FS: 45.2 %
BH CV ECHO MEAS - IVS/LVPW: 1 CM
BH CV ECHO MEAS - IVSD: 0.8 CM
BH CV ECHO MEAS - LA DIMENSION: 3.4 CM
BH CV ECHO MEAS - LAT PEAK E' VEL: 15 CM/SEC
BH CV ECHO MEAS - LV DIASTOLIC VOL/BSA (35-75): 41.4 CM2
BH CV ECHO MEAS - LV MASS(C)D: 101.3 GRAMS
BH CV ECHO MEAS - LV MAX PG: 3.2 MMHG
BH CV ECHO MEAS - LV MEAN PG: 2 MMHG
BH CV ECHO MEAS - LV SYSTOLIC VOL/BSA (12-30): 14 CM2
BH CV ECHO MEAS - LV V1 MAX: 89.4 CM/SEC
BH CV ECHO MEAS - LV V1 VTI: 20.7 CM
BH CV ECHO MEAS - LVIDD: 4.2 CM
BH CV ECHO MEAS - LVIDS: 2.3 CM
BH CV ECHO MEAS - LVOT AREA: 3.1 CM2
BH CV ECHO MEAS - LVOT DIAM: 2 CM
BH CV ECHO MEAS - LVPWD: 0.8 CM
BH CV ECHO MEAS - MED PEAK E' VEL: 8.9 CM/SEC
BH CV ECHO MEAS - MV A MAX VEL: 48.8 CM/SEC
BH CV ECHO MEAS - MV DEC SLOPE: 427 CM/SEC2
BH CV ECHO MEAS - MV DEC TIME: 0.26 MSEC
BH CV ECHO MEAS - MV E MAX VEL: 86.5 CM/SEC
BH CV ECHO MEAS - MV E/A: 1.77
BH CV ECHO MEAS - MV P1/2T: 77.5 MSEC
BH CV ECHO MEAS - MVA(P1/2T): 2.8 CM2
BH CV ECHO MEAS - PA V2 MAX: 89.6 CM/SEC
BH CV ECHO MEAS - RAP SYSTOLE: 5 MMHG
BH CV ECHO MEAS - RV MAX PG: 1.77 MMHG
BH CV ECHO MEAS - RV V1 MAX: 66.6 CM/SEC
BH CV ECHO MEAS - RV V1 VTI: 13.6 CM
BH CV ECHO MEAS - RVSP: 22.5 MMHG
BH CV ECHO MEAS - SI(MOD-SP4): 27.4 ML/M2
BH CV ECHO MEAS - SV(LVOT): 65 ML
BH CV ECHO MEAS - SV(MOD-SP4): 48.3 ML
BH CV ECHO MEAS - TR MAX PG: 17.5 MMHG
BH CV ECHO MEAS - TR MAX VEL: 209 CM/SEC
BH CV ECHO MEASUREMENTS AVERAGE E/E' RATIO: 7.24
LEFT ATRIUM VOLUME INDEX: 27.4 ML/M2

## 2023-10-27 ENCOUNTER — TELEPHONE (OUTPATIENT)
Dept: OBSTETRICS AND GYNECOLOGY | Facility: CLINIC | Age: 58
End: 2023-10-27
Payer: COMMERCIAL

## 2023-12-05 LAB
25(OH)D3+25(OH)D2 SERPL-MCNC: 32.6 NG/ML (ref 30–100)
ALBUMIN SERPL-MCNC: 4.9 G/DL (ref 3.5–5.2)
ALBUMIN/GLOB SERPL: 2.6 G/DL
ALP SERPL-CCNC: 66 U/L (ref 39–117)
ALT SERPL-CCNC: 15 U/L (ref 1–33)
APPEARANCE UR: CLEAR
AST SERPL-CCNC: 14 U/L (ref 1–32)
BACTERIA #/AREA URNS HPF: NORMAL /HPF
BACTERIA UR CULT: NO GROWTH
BACTERIA UR CULT: NORMAL
BASOPHILS # BLD AUTO: 0.04 10*3/MM3 (ref 0–0.2)
BASOPHILS NFR BLD AUTO: 1.2 % (ref 0–1.5)
BILIRUB SERPL-MCNC: 0.6 MG/DL (ref 0–1.2)
BILIRUB UR QL STRIP: NEGATIVE
BUN SERPL-MCNC: 12 MG/DL (ref 6–20)
BUN/CREAT SERPL: 14.6 (ref 7–25)
CALCIUM SERPL-MCNC: 9.6 MG/DL (ref 8.6–10.5)
CASTS URNS QL MICRO: NORMAL /LPF
CHLORIDE SERPL-SCNC: 101 MMOL/L (ref 98–107)
CHOLEST SERPL-MCNC: 156 MG/DL (ref 0–200)
CO2 SERPL-SCNC: 29.7 MMOL/L (ref 22–29)
COLOR UR: YELLOW
CREAT SERPL-MCNC: 0.82 MG/DL (ref 0.57–1)
EGFRCR SERPLBLD CKD-EPI 2021: 83.5 ML/MIN/1.73
EOSINOPHIL # BLD AUTO: 0.13 10*3/MM3 (ref 0–0.4)
EOSINOPHIL NFR BLD AUTO: 3.9 % (ref 0.3–6.2)
EPI CELLS #/AREA URNS HPF: NORMAL /HPF (ref 0–10)
ERYTHROCYTE [DISTWIDTH] IN BLOOD BY AUTOMATED COUNT: 13.1 % (ref 12.3–15.4)
GLOBULIN SER CALC-MCNC: 1.9 GM/DL
GLUCOSE SERPL-MCNC: 80 MG/DL (ref 65–99)
GLUCOSE UR QL STRIP: NEGATIVE
HBA1C MFR BLD: 5.4 % (ref 4.8–5.6)
HCT VFR BLD AUTO: 41.2 % (ref 34–46.6)
HCV IGG SERPL QL IA: NON REACTIVE
HDLC SERPL-MCNC: 66 MG/DL (ref 40–60)
HGB BLD-MCNC: 12.9 G/DL (ref 12–15.9)
HGB UR QL STRIP: NEGATIVE
IMM GRANULOCYTES # BLD AUTO: 0.01 10*3/MM3 (ref 0–0.05)
IMM GRANULOCYTES NFR BLD AUTO: 0.3 % (ref 0–0.5)
KETONES UR QL STRIP: NEGATIVE
LDLC SERPL CALC-MCNC: 76 MG/DL (ref 0–100)
LDLC/HDLC SERPL: 1.14 {RATIO}
LEUKOCYTE ESTERASE UR QL STRIP: NEGATIVE
LYMPHOCYTES # BLD AUTO: 1.25 10*3/MM3 (ref 0.7–3.1)
LYMPHOCYTES NFR BLD AUTO: 37.9 % (ref 19.6–45.3)
MCH RBC QN AUTO: 29.9 PG (ref 26.6–33)
MCHC RBC AUTO-ENTMCNC: 31.3 G/DL (ref 31.5–35.7)
MCV RBC AUTO: 95.4 FL (ref 79–97)
MICRO URNS: ABNORMAL
MICRO URNS: ABNORMAL
MONOCYTES # BLD AUTO: 0.32 10*3/MM3 (ref 0.1–0.9)
MONOCYTES NFR BLD AUTO: 9.7 % (ref 5–12)
NEUTROPHILS # BLD AUTO: 1.55 10*3/MM3 (ref 1.7–7)
NEUTROPHILS NFR BLD AUTO: 47 % (ref 42.7–76)
NITRITE UR QL STRIP: NEGATIVE
NRBC BLD AUTO-RTO: 0 /100 WBC (ref 0–0.2)
PH UR STRIP: 8 [PH] (ref 5–7.5)
PLATELET # BLD AUTO: 223 10*3/MM3 (ref 140–450)
POTASSIUM SERPL-SCNC: 4.7 MMOL/L (ref 3.5–5.2)
PROT SERPL-MCNC: 6.8 G/DL (ref 6–8.5)
PROT UR QL STRIP: NEGATIVE
RBC # BLD AUTO: 4.32 10*6/MM3 (ref 3.77–5.28)
RBC #/AREA URNS HPF: NORMAL /HPF (ref 0–2)
SODIUM SERPL-SCNC: 139 MMOL/L (ref 136–145)
SP GR UR STRIP: 1.01 (ref 1–1.03)
SPECIMEN STATUS: NORMAL
T3RU NFR SERPL: NORMAL %
T4 FREE SERPL-MCNC: 1.29 NG/DL (ref 0.93–1.7)
TRIGL SERPL-MCNC: 74 MG/DL (ref 0–150)
TSH SERPL DL<=0.005 MIU/L-ACNC: 1.23 UIU/ML (ref 0.27–4.2)
URINALYSIS REFLEX: ABNORMAL
UROBILINOGEN UR STRIP-MCNC: 0.2 MG/DL (ref 0.2–1)
VLDLC SERPL CALC-MCNC: 14 MG/DL (ref 5–40)
WBC # BLD AUTO: 3.3 10*3/MM3 (ref 3.4–10.8)
WBC #/AREA URNS HPF: NORMAL /HPF (ref 0–5)

## 2023-12-05 RX ORDER — ERGOCALCIFEROL 1.25 MG/1
50000 CAPSULE ORAL WEEKLY
Qty: 5 CAPSULE | Refills: 12 | Status: SHIPPED | OUTPATIENT
Start: 2023-12-05

## 2023-12-28 LAB — T3RU NFR SERPL: 25 % (ref 24–39)

## 2024-04-04 ENCOUNTER — OFFICE VISIT (OUTPATIENT)
Dept: OBSTETRICS AND GYNECOLOGY | Age: 59
End: 2024-04-04
Payer: COMMERCIAL

## 2024-04-04 VITALS
WEIGHT: 145 LBS | DIASTOLIC BLOOD PRESSURE: 76 MMHG | BODY MASS INDEX: 21.98 KG/M2 | SYSTOLIC BLOOD PRESSURE: 110 MMHG | HEIGHT: 68 IN

## 2024-04-04 DIAGNOSIS — F41.9 ANXIETY: ICD-10-CM

## 2024-04-04 PROCEDURE — 99213 OFFICE O/P EST LOW 20 MIN: CPT | Performed by: NURSE PRACTITIONER

## 2024-04-04 RX ORDER — ALPRAZOLAM 0.5 MG/1
0.25 TABLET ORAL 2 TIMES DAILY PRN
Qty: 60 TABLET | Refills: 0 | Status: SHIPPED | OUTPATIENT
Start: 2024-04-04

## 2024-04-04 NOTE — PROGRESS NOTES
"Subjective     Rebeka Severino is a 58 y.o. female    History of Present Illness  Patient comes in today for refill of Xanax.  States she is doing well.  She does have occasional restless leg but she does not want to do anything about that at this time.  Anxiety  Presents for follow-up visit. Symptoms include excessive worry and nervous/anxious behavior. Patient reports no chest pain, compulsions, confusion, decreased concentration, depressed mood, dizziness, dry mouth, feeling of choking, hyperventilation, impotence, insomnia, irritability, malaise, muscle tension, nausea, obsessions, palpitations, panic, restlessness, shortness of breath or suicidal ideas. Symptoms occur most days. The severity of symptoms is mild. The quality of sleep is good. Nighttime awakenings: occasional.     Compliance with medications is %.         /76   Ht 172.7 cm (67.99\")   Wt 65.8 kg (145 lb)   LMP 11/27/2009 (Approximate)   BMI 22.05 kg/m²     Outpatient Encounter Medications as of 4/4/2024   Medication Sig Dispense Refill    ALPRAZolam (XANAX) 0.5 MG tablet Take 0.5 tablets by mouth 2 (Two) Times a Day As Needed for Anxiety. 60 tablet 0    cetirizine (zyrTEC) 10 MG tablet Take 1 tablet by mouth Daily.      estradiol (ESTRACE) 1 MG tablet Take 1.5 tablets by mouth Daily. 135 tablet 3    magnesium gluconate (MAGONATE) 500 MG tablet Take 1 tablet by mouth 2 (Two) Times a Day.      spironolactone (ALDACTONE) 100 MG tablet Take 1 tablet by mouth Daily. 90 tablet 3    vitamin D (ERGOCALCIFEROL) 1.25 MG (13928 UT) capsule capsule Take 1 capsule by mouth 1 (One) Time Per Week. 5 capsule 12    [DISCONTINUED] ALPRAZolam (XANAX) 0.5 MG tablet Take 0.5 tablets by mouth 2 (Two) Times a Day As Needed for Anxiety. 60 tablet 0     No facility-administered encounter medications on file as of 4/4/2024.       Past Medical History  Past Medical History:   Diagnosis Date    Anxiety        Surgical History  Past Surgical History:   Procedure " Laterality Date    CHOLECYSTECTOMY      COLONOSCOPY N/A 03/02/2017    Procedure: COLONOSCOPY WITH ANESTHESIA;  Surgeon: Jennifer Gallegos MD;  Location: [facility] ENDOSCOPY;  Service:     DILATATION AND CURETTAGE      HYSTERECTOMY      without BSO       Family History  Family History   Problem Relation Age of Onset    Hypertension Mother     Aortic aneurysm Mother     Colon polyps Father     Dementia Father     No Known Problems Sister     No Known Problems Brother     No Known Problems Maternal Grandmother     No Known Problems Paternal Grandmother     No Known Problems Daughter     No Known Problems Son     Breast cancer Maternal Aunt 60    No Known Problems Paternal Aunt     Breast cancer Cousin 40    No Known Problems Other     Ovarian cancer Neg Hx     Uterine cancer Neg Hx     Colon cancer Neg Hx     Melanoma Neg Hx     Prostate cancer Neg Hx     Crohn's disease Neg Hx     Esophageal cancer Neg Hx     Liver cancer Neg Hx     Liver disease Neg Hx     Rectal cancer Neg Hx     Stomach cancer Neg Hx     BRCA 1/2 Neg Hx     Endometrial cancer Neg Hx        The following portions of the patient's history were reviewed and updated as appropriate: allergies, current medications, past family history, past medical history, past social history, past surgical history, and problem list.    Review of Systems   Constitutional:  Negative for irritability.   Respiratory:  Negative for shortness of breath.    Cardiovascular:  Negative for chest pain and palpitations.   Gastrointestinal:  Negative for nausea.   Genitourinary:  Negative for impotence.   Neurological:  Negative for dizziness and confusion.   Psychiatric/Behavioral:  Negative for decreased concentration, suicidal ideas and depressed mood. The patient is nervous/anxious. The patient does not have insomnia.        Objective   Physical Exam  Vitals and nursing note reviewed.   Constitutional:       Appearance: She is well-developed.   HENT:      Head: Normocephalic and  atraumatic.   Eyes:      General:         Right eye: No discharge.         Left eye: No discharge.      Conjunctiva/sclera: Conjunctivae normal.   Neck:      Thyroid: No thyromegaly.   Cardiovascular:      Rate and Rhythm: Normal rate and regular rhythm.      Heart sounds: Normal heart sounds.   Pulmonary:      Effort: Pulmonary effort is normal.      Breath sounds: Normal breath sounds.   Musculoskeletal:         General: Normal range of motion.      Cervical back: Normal range of motion and neck supple.   Skin:     General: Skin is warm and dry.   Neurological:      Mental Status: She is alert and oriented to person, place, and time.   Psychiatric:         Mood and Affect: Mood normal.         Behavior: Behavior normal.         Thought Content: Thought content normal.         Judgment: Judgment normal.         PHQ-9 Depression Screening  Little interest or pleasure in doing things? 0-->not at all   Feeling down, depressed, or hopeless? 0-->not at all   Trouble falling or staying asleep, or sleeping too much?     Feeling tired or having little energy?     Poor appetite or overeating?     Feeling bad about yourself - or that you are a failure or have let yourself or your family down?     Trouble concentrating on things, such as reading the newspaper or watching television?     Moving or speaking so slowly that other people could have noticed? Or the opposite - being so fidgety or restless that you have been moving around a lot more than usual?     Thoughts that you would be better off dead, or of hurting yourself in some way?     PHQ-9 Total Score 0   If you checked off any problems, how difficult have these problems made it for you to do your work, take care of things at home, or get along with other people?          Assessment & Plan   Diagnoses and all orders for this visit:    1. Anxiety  Comments:  She requests a refill of Xanax.  States that she takes it only occasionally.  Rashawn is reviewed.  Orders:  -      ALPRAZolam (XANAX) 0.5 MG tablet; Take 0.5 tablets by mouth 2 (Two) Times a Day As Needed for Anxiety.  Dispense: 60 tablet; Refill: 0         BMI is within normal parameters. No other follow-up for BMI required.      Vani Velez, APRN  4/4/2024

## 2024-06-13 ENCOUNTER — TELEPHONE (OUTPATIENT)
Dept: OBSTETRICS AND GYNECOLOGY | Age: 59
End: 2024-06-13
Payer: COMMERCIAL

## 2024-06-13 NOTE — TELEPHONE ENCOUNTER
Pt called stating she is having symptoms of a UTI and is asking if Vani could send in medication for her or if she could see Vani in office today. Pt advised that we would need to see her in office before prescribing any medication and also that Vani is not in office today. Pt advised she can go to her PCP or an urgent care facility for treatment. Pt voiced understanding

## 2024-07-19 DIAGNOSIS — N95.1 MENOPAUSAL SYMPTOMS: ICD-10-CM

## 2024-07-19 RX ORDER — ESTRADIOL 1 MG/1
TABLET ORAL
Qty: 135 TABLET | Refills: 3 | OUTPATIENT
Start: 2024-07-19

## 2024-07-29 ENCOUNTER — TELEPHONE (OUTPATIENT)
Dept: OBSTETRICS AND GYNECOLOGY | Age: 59
End: 2024-07-29
Payer: COMMERCIAL

## 2024-07-29 DIAGNOSIS — N95.1 MENOPAUSAL SYMPTOMS: ICD-10-CM

## 2024-07-29 RX ORDER — ESTRADIOL 1 MG/1
1.5 TABLET ORAL DAILY
Qty: 135 TABLET | Refills: 3 | OUTPATIENT
Start: 2024-07-29

## 2024-07-29 NOTE — TELEPHONE ENCOUNTER
Caller: Rebeka Severino MARTHA    Relationship: Self    Best call back number:[phone] (home) [phone] (work)      Requested Prescriptions:   Requested Prescriptions     Pending Prescriptions Disp Refills    estradiol (ESTRACE) 1 MG tablet 135 tablet 3     Sig: Take 1.5 tablets by mouth Daily.        Pharmacy where request should be sent:  [facility]  [address]    Last office visit with prescribing clinician: 4/4/2024   Last telemedicine visit with prescribing clinician: Visit date not found   Next office visit with prescribing clinician: 9/23/2024     Additional details provided by patient: PT NEEDS A REFILL ON HER ESTRADIOL. SHE HAS A WEEK OR TWO LEFT.     Does the patient have less than a 3 day supply:  [] Yes  [x] No    Would you like a call back once the refill request has been completed: [x] Yes [] No    If the office needs to give you a call back, can they leave a voicemail: [x] Yes [] No

## 2024-10-29 ENCOUNTER — OFFICE VISIT (OUTPATIENT)
Dept: OBSTETRICS AND GYNECOLOGY | Age: 59
End: 2024-10-29
Payer: COMMERCIAL

## 2024-10-29 VITALS
WEIGHT: 147 LBS | BODY MASS INDEX: 22.28 KG/M2 | SYSTOLIC BLOOD PRESSURE: 110 MMHG | HEIGHT: 68 IN | DIASTOLIC BLOOD PRESSURE: 80 MMHG

## 2024-10-29 DIAGNOSIS — Z01.419 WELL WOMAN EXAM WITH ROUTINE GYNECOLOGICAL EXAM: Primary | ICD-10-CM

## 2024-10-29 DIAGNOSIS — J34.89 NASAL LESION: ICD-10-CM

## 2024-10-29 DIAGNOSIS — Z12.31 ENCOUNTER FOR SCREENING MAMMOGRAM FOR BREAST CANCER: ICD-10-CM

## 2024-10-29 DIAGNOSIS — N95.1 MENOPAUSAL SYMPTOMS: ICD-10-CM

## 2024-10-29 DIAGNOSIS — G25.81 RESTLESS LEG: ICD-10-CM

## 2024-10-29 DIAGNOSIS — E55.9 VITAMIN D DEFICIENCY: ICD-10-CM

## 2024-10-29 DIAGNOSIS — Z13.820 ENCOUNTER FOR SCREENING FOR OSTEOPOROSIS: ICD-10-CM

## 2024-10-29 DIAGNOSIS — L68.0 HIRSUTISM: ICD-10-CM

## 2024-10-29 DIAGNOSIS — F41.9 ANXIETY: ICD-10-CM

## 2024-10-29 RX ORDER — ALPRAZOLAM 0.5 MG
0.25 TABLET ORAL 2 TIMES DAILY PRN
Qty: 60 TABLET | Refills: 0 | Status: SHIPPED | OUTPATIENT
Start: 2024-10-29

## 2024-10-29 RX ORDER — ERGOCALCIFEROL 1.25 MG/1
50000 CAPSULE, LIQUID FILLED ORAL WEEKLY
Qty: 5 CAPSULE | Refills: 12 | Status: SHIPPED | OUTPATIENT
Start: 2024-10-29

## 2024-10-29 RX ORDER — ESTRADIOL 1 MG/1
1.5 TABLET ORAL DAILY
Qty: 135 TABLET | Refills: 3 | Status: CANCELLED | OUTPATIENT
Start: 2024-10-29

## 2024-10-29 RX ORDER — SPIRONOLACTONE 100 MG/1
100 TABLET, FILM COATED ORAL DAILY
Qty: 90 TABLET | Refills: 3 | Status: SHIPPED | OUTPATIENT
Start: 2024-10-29

## 2024-10-29 NOTE — PROGRESS NOTES
"Martine Severino is a 59 y.o. female    History of Present Illness  Patient is here today for yearly checkup.  She has complaints of insomnia, restless leg, and lesions that she is noted in her nasal passage that will go away with Neosporin but comes back.  she is also having some hot flashes.  Gynecologic Exam  The patient's pertinent negatives include no pelvic pain, vaginal bleeding or vaginal discharge. The patient is experiencing no pain. Pertinent negatives include no abdominal pain, anorexia, back pain, chills, constipation, diarrhea, discolored urine, dysuria, fever, flank pain, frequency, headaches, hematuria, joint pain, joint swelling, nausea, painful intercourse, rash, sore throat, urgency or vomiting. She is sexually active. She uses hysterectomy for contraception. She is postmenopausal.         /80   Ht 172.7 cm (67.99\")   Wt 66.7 kg (147 lb)   LMP 11/27/2009 (Approximate)   BMI 22.36 kg/m²     Outpatient Encounter Medications as of 10/29/2024   Medication Sig Dispense Refill    ALPRAZolam (XANAX) 0.5 MG tablet Take 0.5 tablets by mouth 2 (Two) Times a Day As Needed for Anxiety. 60 tablet 0    cetirizine (zyrTEC) 10 MG tablet Take 1 tablet by mouth Daily.      estradiol (ESTRACE) 1 MG tablet Take 1.5 tablets by mouth Daily. 135 tablet 3    magnesium gluconate (MAGONATE) 500 MG tablet Take 1 tablet by mouth 2 (Two) Times a Day.      spironolactone (ALDACTONE) 100 MG tablet Take 1 tablet by mouth Daily. 90 tablet 3    vitamin D (ERGOCALCIFEROL) 1.25 MG (83174 UT) capsule capsule Take 1 capsule by mouth 1 (One) Time Per Week. 5 capsule 12    [DISCONTINUED] ALPRAZolam (XANAX) 0.5 MG tablet Take 0.5 tablets by mouth 2 (Two) Times a Day As Needed for Anxiety. 60 tablet 0    [DISCONTINUED] spironolactone (ALDACTONE) 100 MG tablet Take 1 tablet by mouth Daily. 90 tablet 3    [DISCONTINUED] vitamin D (ERGOCALCIFEROL) 1.25 MG (91049 UT) capsule capsule Take 1 capsule by mouth 1 (One) Time " Per Week. 5 capsule 12    mupirocin (BACTROBAN) 2 % nasal ointment Administer 1 Application into the nostril(s) as directed by provider 2 (Two) Times a Day. 30 g 3     No facility-administered encounter medications on file as of 10/29/2024.       Past Medical History  Past Medical History:   Diagnosis Date    Anxiety        Surgical History  Past Surgical History:   Procedure Laterality Date    CHOLECYSTECTOMY      COLONOSCOPY N/A 03/02/2017    Procedure: COLONOSCOPY WITH ANESTHESIA;  Surgeon: Jennifer Gallegos MD;  Location: [facility] ENDOSCOPY;  Service:     DILATATION AND CURETTAGE      HYSTERECTOMY      without BSO       Family History  Family History   Problem Relation Age of Onset    Hypertension Mother     Aortic aneurysm Mother     Colon polyps Father     Dementia Father     No Known Problems Sister     No Known Problems Brother     No Known Problems Maternal Grandmother     No Known Problems Paternal Grandmother     No Known Problems Daughter     No Known Problems Son     Breast cancer Maternal Aunt 60    No Known Problems Paternal Aunt     Breast cancer Cousin 40    No Known Problems Other     Ovarian cancer Neg Hx     Uterine cancer Neg Hx     Colon cancer Neg Hx     Melanoma Neg Hx     Prostate cancer Neg Hx     Crohn's disease Neg Hx     Esophageal cancer Neg Hx     Liver cancer Neg Hx     Liver disease Neg Hx     Rectal cancer Neg Hx     Stomach cancer Neg Hx     BRCA 1/2 Neg Hx     Endometrial cancer Neg Hx        The following portions of the patient's history were reviewed and updated as appropriate: allergies, current medications, past family history, past medical history, past social history, past surgical history, and problem list.    Review of Systems   Constitutional:  Negative for activity change, appetite change, chills, diaphoresis, fatigue, fever, unexpected weight gain and unexpected weight loss.   HENT:  Negative for congestion, dental problem, drooling, ear discharge, ear pain, facial  swelling, hearing loss, mouth sores, nosebleeds, postnasal drip, rhinorrhea, sinus pressure, sneezing, sore throat, swollen glands, tinnitus, trouble swallowing and voice change.    Eyes:  Negative for blurred vision, double vision, photophobia, pain, discharge, redness, itching and visual disturbance.   Respiratory:  Negative for apnea, cough, choking, chest tightness, shortness of breath, wheezing and stridor.    Cardiovascular:  Negative for chest pain, palpitations and leg swelling.   Gastrointestinal:  Negative for abdominal distention, abdominal pain, anal bleeding, anorexia, blood in stool, constipation, diarrhea, nausea, rectal pain, vomiting, GERD and indigestion.   Endocrine: Negative for cold intolerance, heat intolerance, polydipsia, polyphagia and polyuria.   Genitourinary:  Negative for amenorrhea, breast discharge, breast lump, breast pain, decreased libido, decreased urine volume, difficulty urinating, dyspareunia, dysuria, flank pain, frequency, genital sores, hematuria, menstrual problem, pelvic pain, pelvic pressure, urgency, urinary incontinence, vaginal bleeding, vaginal discharge and vaginal pain.   Musculoskeletal:  Negative for arthralgias, back pain, gait problem, joint pain, joint swelling, myalgias, neck pain, neck stiffness and bursitis.   Skin:  Negative for color change, dry skin and rash.   Allergic/Immunologic: Negative for environmental allergies, food allergies and immunocompromised state.   Neurological:  Negative for dizziness, tremors, seizures, syncope, facial asymmetry, speech difficulty, weakness, light-headedness, numbness, headache, memory problem and confusion.   Hematological:  Negative for adenopathy. Does not bruise/bleed easily.   Psychiatric/Behavioral:  Negative for agitation, behavioral problems, decreased concentration, dysphoric mood, hallucinations, self-injury, sleep disturbance, suicidal ideas, negative for hyperactivity, depressed mood and stress. The patient  is not nervous/anxious.        Objective   Physical Exam  Vitals and nursing note reviewed. Exam conducted with a chaperone present.   Constitutional:       General: She is not in acute distress.     Appearance: She is well-developed. She is not diaphoretic.   HENT:      Head: Normocephalic.      Right Ear: External ear normal.      Left Ear: External ear normal.      Nose: Nose normal.   Eyes:      General: No scleral icterus.        Right eye: No discharge.         Left eye: No discharge.      Conjunctiva/sclera: Conjunctivae normal.      Pupils: Pupils are equal, round, and reactive to light.   Neck:      Thyroid: No thyromegaly.      Vascular: No carotid bruit.      Trachea: No tracheal deviation.   Cardiovascular:      Rate and Rhythm: Normal rate and regular rhythm.      Heart sounds: Normal heart sounds. No murmur heard.  Pulmonary:      Effort: Pulmonary effort is normal. No respiratory distress.      Breath sounds: Normal breath sounds. No wheezing.   Chest:   Breasts:     Breasts are symmetrical.      Right: Normal. No swelling, bleeding, inverted nipple, mass, nipple discharge, skin change or tenderness.      Left: Normal. No swelling, bleeding, inverted nipple, mass, nipple discharge, skin change or tenderness.   Abdominal:      General: There is no distension.      Palpations: Abdomen is soft. There is no mass.      Tenderness: There is no abdominal tenderness. There is no right CVA tenderness, left CVA tenderness or guarding.      Hernia: No hernia is present. There is no hernia in the left inguinal area or right inguinal area.   Genitourinary:     General: Normal vulva.      Exam position: Lithotomy position.      Labia:         Right: No rash, tenderness, lesion or injury.         Left: No rash, tenderness, lesion or injury.       Vagina: Normal. No signs of injury and foreign body. No vaginal discharge, erythema, tenderness or bleeding.      Uterus: Absent.       Adnexa: Right adnexa normal and left  adnexa normal.        Right: No mass, tenderness or fullness.          Left: No mass, tenderness or fullness.        Rectum: Normal. No mass.      Comments: Cervix and uterus are surgically absent  BSU normal  Urethral meatus  Normal  Perineum  Normal  Musculoskeletal:         General: No tenderness. Normal range of motion.      Cervical back: Normal range of motion and neck supple.   Lymphadenopathy:      Head:      Right side of head: No submental, submandibular, tonsillar, preauricular, posterior auricular or occipital adenopathy.      Left side of head: No submental, submandibular, tonsillar, preauricular, posterior auricular or occipital adenopathy.      Cervical: No cervical adenopathy.      Right cervical: No superficial, deep or posterior cervical adenopathy.     Left cervical: No superficial, deep or posterior cervical adenopathy.      Upper Body:      Right upper body: No supraclavicular, axillary or pectoral adenopathy.      Left upper body: No supraclavicular, axillary or pectoral adenopathy.      Lower Body: No right inguinal adenopathy. No left inguinal adenopathy.   Skin:     General: Skin is warm and dry.      Findings: No bruising, erythema or rash.   Neurological:      Mental Status: She is alert and oriented to person, place, and time.      Coordination: Coordination normal.   Psychiatric:         Mood and Affect: Mood normal.         Behavior: Behavior normal.         Thought Content: Thought content normal.         Judgment: Judgment normal.         PHQ-9 Depression Screening  Little interest or pleasure in doing things? Not at all   Feeling down, depressed, or hopeless? Not at all   PHQ-2 Total Score 0   Trouble falling or staying asleep, or sleeping too much?     Feeling tired or having little energy?     Poor appetite or overeating?     Feeling bad about yourself - or that you are a failure or have let yourself or your family down?     Trouble concentrating on things, such as reading the  newspaper or watching television?     Moving or speaking so slowly that other people could have noticed? Or the opposite - being so fidgety or restless that you have been moving around a lot more than usual?     Thoughts that you would be better off dead, or of hurting yourself in some way?     PHQ-9 Total Score     If you checked off any problems, how difficult have these problems made it for you to do your work, take care of things at home, or get along with other people?         Assessment & Plan   Diagnoses and all orders for this visit:    1. Well woman exam with routine gynecological exam (Primary)  Normal GYN exam. Will have lab work. Encouraged SBE, pt is aware how to do self breast exam and the importance of same. Discussed weight management and importance of maintaining a healthy weight. Discussed Vitamin D intake and the importance of adequate vitamin D for both Bone Health and a healthy immune system.  Discussed Daily exercise and the importance of same, in regards to a healthy heart as well as helping to maintain her weight and improving her mental health.  BMI 4.  Colonoscopy is up to date.  Mammogram and bone density will be scheduled at [facility].  Pap smear is not done after  we discussed ASCCP guidelines.  She has had a previous hysterectomy.        -     CBC & Differential  -     TSH  -     T3, Uptake  -     T4, Free  -     Hemoglobin A1c  -     Urine Culture - , Urine, Clean Catch  -     UA / M With / Rflx Culture([facility] ONLY) - Urine, Clean Catch  -     Hepatitis C Antibody    2. Encounter for screening mammogram for breast cancer  Comments:  She will have a mammogram at [facility].  Orders:  -     Mammo Screening Digital Tomosynthesis Bilateral With CAD; Future    3. Encounter for screening for osteoporosis  Comments:  Patient will have a bone density at [facility].  Orders:  -     DEXA Bone Density Axial; Future    4. Menopausal  symptoms  Comments:  Patient is having some hot flashes on Estrace.  She thinks she would like to switch to bioidentical.  She will have labs today and I will send those to [facility].  We did discuss if she starts testosterone that she would need a visit every 3 months.  Orders:  -     Cortisol  -     DHEA-Sulfate  -     Estradiol  -     Progesterone  -     Testosterone    5. Anxiety  Comments:  She requests a refill of Xanax.  States that she takes it only occasionally.  Rashawn is reviewed.  Orders:  -     ALPRAZolam (XANAX) 0.5 MG tablet; Take 0.5 tablets by mouth 2 (Two) Times a Day As Needed for Anxiety.  Dispense: 60 tablet; Refill: 0    6. Hirsutism  Comments:  Doing well with Aldactone.  Orders:  -     spironolactone (ALDACTONE) 100 MG tablet; Take 1 tablet by mouth Daily.  Dispense: 90 tablet; Refill: 3    7. Vitamin D deficiency  Comments:  Patient will have labs drawn.  Orders:  -     vitamin D (ERGOCALCIFEROL) 1.25 MG (09011 UT) capsule capsule; Take 1 capsule by mouth 1 (One) Time Per Week.  Dispense: 5 capsule; Refill: 12  -     Vitamin D,25-Hydroxy    8. Nasal lesion  Comments:  Patient is having lesions in her nose.  She will use Bactroban daily for 6 weeks and if she sees no improvement she will be referred to ENT.  Orders:  -     mupirocin (BACTROBAN) 2 % nasal ointment; Administer 1 Application into the nostril(s) as directed by provider 2 (Two) Times a Day.  Dispense: 30 g; Refill: 3    9. Restless leg  Comments:  Complaints of restless leg.  She has tried Xanax at bedtime and it does seem to help.  She will continue same.  She will let me know if it does not help and we will send her to her PCP.         BMI is within normal parameters. No other follow-up for BMI required.      Vani Velez, APRN  10/29/2024

## 2024-10-31 LAB
25(OH)D3+25(OH)D2 SERPL-MCNC: 64.2 NG/ML (ref 30–100)
APPEARANCE UR: CLEAR
BACTERIA #/AREA URNS HPF: NORMAL /[HPF]
BACTERIA UR CULT: NO GROWTH
BACTERIA UR CULT: NORMAL
BASOPHILS # BLD AUTO: 0.06 10*3/MM3 (ref 0–0.2)
BASOPHILS NFR BLD AUTO: 1.7 % (ref 0–1.5)
BILIRUB UR QL STRIP: NEGATIVE
CASTS URNS QL MICRO: NORMAL /LPF
COLOR UR: YELLOW
CORTIS SERPL-MCNC: 11.9 UG/DL (ref 6.2–19.4)
DHEA-S SERPL-MCNC: 19.5 UG/DL (ref 29.4–220.5)
EOSINOPHIL # BLD AUTO: 0.19 10*3/MM3 (ref 0–0.4)
EOSINOPHIL NFR BLD AUTO: 5.2 % (ref 0.3–6.2)
EPI CELLS #/AREA URNS HPF: NORMAL /HPF (ref 0–10)
ERYTHROCYTE [DISTWIDTH] IN BLOOD BY AUTOMATED COUNT: 12.7 % (ref 12.3–15.4)
ESTRADIOL SERPL-MCNC: 15.4 PG/ML
GLUCOSE UR QL STRIP: NEGATIVE
HBA1C MFR BLD: 5.1 % (ref 4.8–5.6)
HCT VFR BLD AUTO: 37.5 % (ref 34–46.6)
HCV IGG SERPL QL IA: NON REACTIVE
HGB BLD-MCNC: 12.3 G/DL (ref 12–15.9)
HGB UR QL STRIP: NEGATIVE
IMM GRANULOCYTES # BLD AUTO: 0.01 10*3/MM3 (ref 0–0.05)
IMM GRANULOCYTES NFR BLD AUTO: 0.3 % (ref 0–0.5)
KETONES UR QL STRIP: NEGATIVE
LEUKOCYTE ESTERASE UR QL STRIP: NEGATIVE
LYMPHOCYTES # BLD AUTO: 1.14 10*3/MM3 (ref 0.7–3.1)
LYMPHOCYTES NFR BLD AUTO: 31.5 % (ref 19.6–45.3)
MCH RBC QN AUTO: 30.6 PG (ref 26.6–33)
MCHC RBC AUTO-ENTMCNC: 32.8 G/DL (ref 31.5–35.7)
MCV RBC AUTO: 93.3 FL (ref 79–97)
MICRO URNS: NORMAL
MICRO URNS: NORMAL
MONOCYTES # BLD AUTO: 0.36 10*3/MM3 (ref 0.1–0.9)
MONOCYTES NFR BLD AUTO: 9.9 % (ref 5–12)
NEUTROPHILS # BLD AUTO: 1.86 10*3/MM3 (ref 1.7–7)
NEUTROPHILS NFR BLD AUTO: 51.4 % (ref 42.7–76)
NITRITE UR QL STRIP: NEGATIVE
NRBC BLD AUTO-RTO: 0 /100 WBC (ref 0–0.2)
PH UR STRIP: 7 [PH] (ref 5–7.5)
PLATELET # BLD AUTO: 208 10*3/MM3 (ref 140–450)
PROGEST SERPL-MCNC: <0.1 NG/ML
PROT UR QL STRIP: NEGATIVE
RBC # BLD AUTO: 4.02 10*6/MM3 (ref 3.77–5.28)
RBC #/AREA URNS HPF: NORMAL /HPF (ref 0–2)
SP GR UR STRIP: 1.01 (ref 1–1.03)
T3RU NFR SERPL: 27 % (ref 24–39)
T4 FREE SERPL-MCNC: 1.3 NG/DL (ref 0.92–1.68)
TESTOST SERPL-MCNC: <3 NG/DL (ref 4–50)
TSH SERPL DL<=0.005 MIU/L-ACNC: 1.41 UIU/ML (ref 0.27–4.2)
URINALYSIS REFLEX: NORMAL
UROBILINOGEN UR STRIP-MCNC: 0.2 MG/DL (ref 0.2–1)
WBC # BLD AUTO: 3.62 10*3/MM3 (ref 3.4–10.8)
WBC #/AREA URNS HPF: NORMAL /HPF (ref 0–5)

## 2024-11-25 ENCOUNTER — HOSPITAL ENCOUNTER (OUTPATIENT)
Dept: MAMMOGRAPHY | Facility: HOSPITAL | Age: 59
Discharge: HOME | End: 2024-11-25
Payer: COMMERCIAL

## 2024-11-25 ENCOUNTER — HOSPITAL ENCOUNTER (OUTPATIENT)
Dept: BONE DENSITY | Facility: HOSPITAL | Age: 59
Discharge: HOME | End: 2024-11-25
Payer: COMMERCIAL

## 2024-11-25 DIAGNOSIS — Z13.820 ENCOUNTER FOR SCREENING FOR OSTEOPOROSIS: ICD-10-CM

## 2024-11-25 DIAGNOSIS — Z12.31 ENCOUNTER FOR SCREENING MAMMOGRAM FOR BREAST CANCER: ICD-10-CM

## 2024-11-25 PROCEDURE — 77063 BREAST TOMOSYNTHESIS BI: CPT

## 2024-11-25 PROCEDURE — 77067 SCR MAMMO BI INCL CAD: CPT

## 2024-11-25 PROCEDURE — 77080 DXA BONE DENSITY AXIAL: CPT

## 2024-12-19 RX ORDER — PROGESTERONE 100 %
75 POWDER (GRAM) MISCELLANEOUS DAILY
Qty: 30 G | Refills: 0 | Status: SHIPPED | OUTPATIENT
Start: 2024-12-19

## 2024-12-27 DIAGNOSIS — E55.9 VITAMIN D DEFICIENCY: ICD-10-CM

## 2024-12-27 RX ORDER — ERGOCALCIFEROL 1.25 MG/1
50000 CAPSULE, LIQUID FILLED ORAL
Qty: 5 CAPSULE | Refills: 12 | OUTPATIENT
Start: 2024-12-27

## 2025-01-16 ENCOUNTER — TELEPHONE (OUTPATIENT)
Dept: OBSTETRICS AND GYNECOLOGY | Age: 60
End: 2025-01-16
Payer: COMMERCIAL

## 2025-01-16 RX ORDER — ESTRADIOL 1 MG/1
1 TABLET ORAL DAILY
Qty: 30 TABLET | Refills: 12 | Status: SHIPPED | OUTPATIENT
Start: 2025-01-16

## 2025-01-16 NOTE — TELEPHONE ENCOUNTER
Pt called and left voicemail on nurse line staying that the compound hormones are no longer working and would like to restart the Estradiol she was taking previously.  Her last annual was 10/29/2024 and has f/u appt on 01/21/2025.  She was previously on Estradiol 1mg and did well on this and is s/p hysterectomy.

## 2025-01-21 ENCOUNTER — TELEMEDICINE (OUTPATIENT)
Dept: OBSTETRICS AND GYNECOLOGY | Age: 60
End: 2025-01-21
Payer: COMMERCIAL

## 2025-01-21 VITALS — WEIGHT: 146 LBS | BODY MASS INDEX: 22.13 KG/M2 | HEIGHT: 68 IN

## 2025-01-21 DIAGNOSIS — Z71.84 TRAVEL ADVICE ENCOUNTER: ICD-10-CM

## 2025-01-21 DIAGNOSIS — N95.1 MENOPAUSAL SYMPTOMS: ICD-10-CM

## 2025-01-21 DIAGNOSIS — F41.9 ANXIETY: Primary | ICD-10-CM

## 2025-01-21 PROCEDURE — 99213 OFFICE O/P EST LOW 20 MIN: CPT | Performed by: NURSE PRACTITIONER

## 2025-01-21 RX ORDER — ONDANSETRON 4 MG/1
4 TABLET, ORALLY DISINTEGRATING ORAL EVERY 8 HOURS PRN
Qty: 30 TABLET | Refills: 3 | Status: SHIPPED | OUTPATIENT
Start: 2025-01-21

## 2025-01-21 RX ORDER — ALPRAZOLAM 0.5 MG
0.25 TABLET ORAL 2 TIMES DAILY PRN
Qty: 60 TABLET | Refills: 0 | Status: SHIPPED | OUTPATIENT
Start: 2025-01-21

## 2025-01-21 RX ORDER — AZITHROMYCIN 250 MG/1
TABLET, FILM COATED ORAL
Qty: 6 TABLET | Refills: 0 | Status: SHIPPED | OUTPATIENT
Start: 2025-01-21

## 2025-01-21 NOTE — PROGRESS NOTES
"Subjective     eRbeka Severino is a 59 y.o. female    History of Present Illness  You have chosen to receive care through a telehealth visit.  Do you consent to use a video/audio connection for your medical care today? Yes  Patient calls today from her place of employment for her telehealth visit.  I am located at [facility].  She calls to discuss her hormones and anxiety.  She has stopped her bioidentical hormone is restarted Estrace.  She feels she is doing okay.  She also needs a refill on Xanax.  She denies any suicidal thoughts.      Anxiety  Presents for follow-up visit.  Symptoms include excessive worry and nervous/anxious behavior.  Patient reports no chest pain, compulsions, confusion, decreased concentration, depressed mood, dizziness, dry mouth, feeling of choking, hyperventilation, impotence, insomnia, irritability, malaise, muscle tension, nausea, obsessions, palpitations, panic, restlessness, shortness of breath or suicidal ideas. Symptoms occur most days. The severity of symptoms is mild. The quality of sleep is good. Awakens seldom during the night. Past treatments include benzodiazephines. The treatment provided significant relief. Compliance with medications is %.         Ht 172.7 cm (67.99\")   Wt 66.2 kg (146 lb)   LMP 11/27/2009 (Approximate)   BMI 22.21 kg/m²     Outpatient Encounter Medications as of 1/21/2025   Medication Sig Dispense Refill    ALPRAZolam (XANAX) 0.5 MG tablet Take 0.5 tablets by mouth 2 (Two) Times a Day As Needed for Anxiety. 60 tablet 0    azithromycin (Zithromax Z-Greyson) 250 MG tablet Take 2 tablets the first day, then 1 tablet daily for 4 days. 6 tablet 0    cetirizine (zyrTEC) 10 MG tablet Take 1 tablet by mouth Daily.      estradiol (Estrace) 1 MG tablet Take 1 tablet by mouth Daily. 30 tablet 12    magnesium gluconate (MAGONATE) 500 MG tablet Take 1 tablet by mouth 2 (Two) Times a Day.      mupirocin (BACTROBAN) 2 % nasal ointment Administer 1 " Application into the nostril(s) as directed by provider 2 (Two) Times a Day. 30 g 3    ondansetron ODT (ZOFRAN-ODT) 4 MG disintegrating tablet Place 1 tablet on the tongue Every 8 (Eight) Hours As Needed for Nausea or Vomiting. 30 tablet 3    Progesterone powder Use 75 mg Daily. **compounded** progesterone 75mg capsule- take one capsule by mouth once daily 30 g 0    spironolactone (ALDACTONE) 100 MG tablet Take 1 tablet by mouth Daily. 90 tablet 3    vitamin D (ERGOCALCIFEROL) 1.25 MG (15530 UT) capsule capsule Take 1 capsule by mouth 1 (One) Time Per Week. 5 capsule 12    [DISCONTINUED] ALPRAZolam (XANAX) 0.5 MG tablet Take 0.5 tablets by mouth 2 (Two) Times a Day As Needed for Anxiety. 60 tablet 0     No facility-administered encounter medications on file as of 1/21/2025.       Past Medical History  Past Medical History:   Diagnosis Date    Anxiety        Surgical History  Past Surgical History:   Procedure Laterality Date    CHOLECYSTECTOMY      COLONOSCOPY N/A 03/02/2017    Procedure: COLONOSCOPY WITH ANESTHESIA;  Surgeon: Jennifer Gallegos MD;  Location: [facility] ENDOSCOPY;  Service:     DILATATION AND CURETTAGE      HYSTERECTOMY      without BSO       Family History  Family History   Problem Relation Age of Onset    Hypertension Mother     Aortic aneurysm Mother     Colon polyps Father     Dementia Father     No Known Problems Sister     No Known Problems Brother     No Known Problems Maternal Grandmother     No Known Problems Paternal Grandmother     No Known Problems Daughter     No Known Problems Son     Breast cancer Maternal Aunt 60    No Known Problems Paternal Aunt     Breast cancer Cousin 40    No Known Problems Other     Ovarian cancer Neg Hx     Uterine cancer Neg Hx     Colon cancer Neg Hx     Melanoma Neg Hx     Prostate cancer Neg Hx     Crohn's disease Neg Hx     Esophageal cancer Neg Hx     Liver cancer Neg Hx     Liver disease Neg Hx     Rectal cancer Neg Hx     Stomach cancer Neg Hx     BRCA 1/2  Neg Hx     Endometrial cancer Neg Hx        The following portions of the patient's history were reviewed and updated as appropriate: allergies, current medications, past family history, past medical history, past social history, past surgical history, and problem list.    Review of Systems   Constitutional:  Negative for irritability.   Respiratory:  Negative for shortness of breath.    Cardiovascular:  Negative for chest pain and palpitations.   Gastrointestinal:  Negative for nausea.   Endocrine: Positive for heat intolerance.   Genitourinary:  Negative for impotence.   Neurological:  Negative for dizziness and confusion.   Psychiatric/Behavioral:  Negative for decreased concentration, suicidal ideas and depressed mood. The patient is nervous/anxious. The patient does not have insomnia.        Objective   Physical Exam  Constitutional:       General: She is not in acute distress.     Appearance: Normal appearance. She is not ill-appearing or diaphoretic.   HENT:      Head: Normocephalic and atraumatic.   Pulmonary:      Effort: Pulmonary effort is normal. No respiratory distress.   Musculoskeletal:         General: No deformity.      Cervical back: Normal range of motion.   Skin:     Coloration: Skin is not pale.   Neurological:      General: No focal deficit present.      Mental Status: She is alert.   Psychiatric:         Mood and Affect: Mood normal.         Behavior: Behavior normal.         Thought Content: Thought content normal.         Judgment: Judgment normal.         PHQ-9 Depression Screening  Little interest or pleasure in doing things? Not at all   Feeling down, depressed, or hopeless? Not at all   PHQ-2 Total Score 0   Trouble falling or staying asleep, or sleeping too much?     Feeling tired or having little energy?     Poor appetite or overeating?     Feeling bad about yourself - or that you are a failure or have let yourself or your family down?     Trouble concentrating on things, such as  reading the newspaper or watching television?     Moving or speaking so slowly that other people could have noticed? Or the opposite - being so fidgety or restless that you have been moving around a lot more than usual?     Thoughts that you would be better off dead, or of hurting yourself in some way?     PHQ-9 Total Score     If you checked off any problems, how difficult have these problems made it for you to do your work, take care of things at home, or get along with other people?         Assessment & Plan   Diagnoses and all orders for this visit:    1. Anxiety (Primary)  Comments:  She requests a refill of Xanax.  States that she takes it only occasionally.  Rashawn is reviewed.  Orders:  -     ALPRAZolam (XANAX) 0.5 MG tablet; Take 0.5 tablets by mouth 2 (Two) Times a Day As Needed for Anxiety.  Dispense: 60 tablet; Refill: 0    2. Travel advice encounter  Comments:  Patient is traveling in May and would like a prescription for Z-Greyson and Zofran to take with her.  Orders:  -     ondansetron ODT (ZOFRAN-ODT) 4 MG disintegrating tablet; Place 1 tablet on the tongue Every 8 (Eight) Hours As Needed for Nausea or Vomiting.  Dispense: 30 tablet; Refill: 3  -     azithromycin (Zithromax Z-Greyson) 250 MG tablet; Take 2 tablets the first day, then 1 tablet daily for 4 days.  Dispense: 6 tablet; Refill: 0    3. Menopausal symptoms  Comments:  Patient has been on bioidentical hormones from [facility].  She felt like they were no longer working.  She wanted to switch back to Estrace.  She like to stop the progesterone.  She will call if she has any difficulty sleeping and we will restart it.     This was an audio and video enabled telemedicine encounter.      BMI is within normal parameters. No other follow-up for BMI required.      Vani Velez, APRN  1/21/2025

## 2025-04-18 ENCOUNTER — OFFICE VISIT (OUTPATIENT)
Dept: OBSTETRICS AND GYNECOLOGY | Age: 60
End: 2025-04-18
Payer: COMMERCIAL

## 2025-04-18 VITALS
WEIGHT: 143 LBS | DIASTOLIC BLOOD PRESSURE: 82 MMHG | SYSTOLIC BLOOD PRESSURE: 112 MMHG | HEIGHT: 67 IN | BODY MASS INDEX: 22.44 KG/M2

## 2025-04-18 DIAGNOSIS — F41.9 ANXIETY: ICD-10-CM

## 2025-04-18 DIAGNOSIS — Z71.84 TRAVEL ADVICE ENCOUNTER: ICD-10-CM

## 2025-04-18 RX ORDER — ONDANSETRON 4 MG/1
4 TABLET, ORALLY DISINTEGRATING ORAL EVERY 8 HOURS PRN
Qty: 30 TABLET | Refills: 3 | Status: SHIPPED | OUTPATIENT
Start: 2025-04-18

## 2025-04-18 RX ORDER — ALPRAZOLAM 0.5 MG
0.25 TABLET ORAL 2 TIMES DAILY PRN
Qty: 60 TABLET | Refills: 0 | Status: SHIPPED | OUTPATIENT
Start: 2025-04-18

## 2025-04-18 RX ORDER — SULFAMETHOXAZOLE AND TRIMETHOPRIM 800; 160 MG/1; MG/1
1 TABLET ORAL 2 TIMES DAILY
Qty: 20 TABLET | Refills: 0 | Status: SHIPPED | OUTPATIENT
Start: 2025-04-18

## 2025-04-18 RX ORDER — AZITHROMYCIN 250 MG/1
TABLET, FILM COATED ORAL
Qty: 6 TABLET | Refills: 0 | Status: SHIPPED | OUTPATIENT
Start: 2025-04-18

## 2025-04-18 NOTE — PROGRESS NOTES
"Subjective     Rebeka Severino is a 59 y.o. female    Anxiety  Presents for follow-up visit.  Symptoms include nervous/anxious behavior.  Patient reports no obsessions, palpitations, panic, restlessness, shortness of breath or suicidal ideas.       History of Present Illness  The patient is a 59-year-old female who presents to discuss anxiety.    An improvement in hormonal balance is reported, attributed to the discontinuation of progesterone and the exclusive use of Estrace. Compliance with vitamin D supplementation is noted.    During her last visit in the fall, Zofran 4 mg dissolvable and Z-Greyson were prescribed in anticipation of a trip to [facility] in May. Travel plans have since changed, and she will be visiting [place], [facility] and [facility], from 07/23/2025 to 08/18/2025 or 08/19/2025. A refill of these medications is requested for the upcoming trip. Plans to visit a travel clinic for necessary vaccinations are mentioned, with a desire to have basic medications readily available. No known allergies to sulfa drugs are reported.    /82   Ht 170.2 cm (67\")   Wt 64.9 kg (143 lb)   LMP 11/27/2009 (Approximate)   BMI 22.40 kg/m²     Outpatient Encounter Medications as of 4/18/2025   Medication Sig Dispense Refill    ALPRAZolam (XANAX) 0.5 MG tablet Take 0.5 tablets by mouth 2 (Two) Times a Day As Needed for Anxiety. 60 tablet 0    azithromycin (Zithromax Z-Greyson) 250 MG tablet Take 2 tablets the first day, then 1 tablet daily for 4 days. 6 tablet 0    cetirizine (zyrTEC) 10 MG tablet Take 1 tablet by mouth Daily.      estradiol (Estrace) 1 MG tablet Take 1 tablet by mouth Daily. 30 tablet 12    magnesium gluconate (MAGONATE) 500 MG tablet Take 1 tablet by mouth 2 (Two) Times a Day.      mupirocin (BACTROBAN) 2 % nasal ointment Administer 1 Application into the nostril(s) as directed by provider 2 (Two) Times a Day. 30 g 3    ondansetron ODT (ZOFRAN-ODT) 4 MG disintegrating tablet Place 1 tablet on the " tongue Every 8 (Eight) Hours As Needed for Nausea or Vomiting. 30 tablet 3    spironolactone (ALDACTONE) 100 MG tablet Take 1 tablet by mouth Daily. 90 tablet 3    vitamin D (ERGOCALCIFEROL) 1.25 MG (98086 UT) capsule capsule Take 1 capsule by mouth 1 (One) Time Per Week. 5 capsule 12    [DISCONTINUED] ALPRAZolam (XANAX) 0.5 MG tablet Take 0.5 tablets by mouth 2 (Two) Times a Day As Needed for Anxiety. 60 tablet 0    [DISCONTINUED] ondansetron ODT (ZOFRAN-ODT) 4 MG disintegrating tablet Place 1 tablet on the tongue Every 8 (Eight) Hours As Needed for Nausea or Vomiting. 30 tablet 3    [DISCONTINUED] Progesterone powder Use 75 mg Daily. **compounded** progesterone 75mg capsule- take one capsule by mouth once daily 30 g 0    sulfamethoxazole-trimethoprim (BACTRIM DS,SEPTRA DS) 800-160 MG per tablet Take 1 tablet by mouth 2 (Two) Times a Day. 20 tablet 0    [DISCONTINUED] azithromycin (Zithromax Z-Greyson) 250 MG tablet Take 2 tablets the first day, then 1 tablet daily for 4 days. (Patient not taking: Reported on 4/18/2025) 6 tablet 0     No facility-administered encounter medications on file as of 4/18/2025.       Past Medical History  Past Medical History:   Diagnosis Date    Anxiety        Surgical History  Past Surgical History:   Procedure Laterality Date    CHOLECYSTECTOMY      COLONOSCOPY N/A 03/02/2017    Procedure: COLONOSCOPY WITH ANESTHESIA;  Surgeon: Jennifer Gallegos MD;  Location: [facility] ENDOSCOPY;  Service:     DILATATION AND CURETTAGE      HYSTERECTOMY      without BSO       Family History  Family History   Problem Relation Age of Onset    Hypertension Mother     Aortic aneurysm Mother     Colon polyps Father     Dementia Father     No Known Problems Sister     No Known Problems Brother     No Known Problems Maternal Grandmother     No Known Problems Paternal Grandmother     No Known Problems Daughter     No Known Problems Son     Breast cancer Maternal Aunt 60    No Known Problems Paternal Aunt     Breast  cancer Cousin 40    No Known Problems Other     Ovarian cancer Neg Hx     Uterine cancer Neg Hx     Colon cancer Neg Hx     Melanoma Neg Hx     Prostate cancer Neg Hx     Crohn's disease Neg Hx     Esophageal cancer Neg Hx     Liver cancer Neg Hx     Liver disease Neg Hx     Rectal cancer Neg Hx     Stomach cancer Neg Hx     BRCA 1/2 Neg Hx     Endometrial cancer Neg Hx        The following portions of the patient's history were reviewed and updated as appropriate: allergies, current medications, past family history, past medical history, past social history, past surgical history, and problem list.    Review of Systems   Respiratory:  Negative for shortness of breath.    Cardiovascular:  Negative for palpitations.   Psychiatric/Behavioral:  Negative for suicidal ideas. The patient is nervous/anxious.        Objective   Physical Exam  Vitals and nursing note reviewed.   Constitutional:       Appearance: She is well-developed.   HENT:      Head: Normocephalic and atraumatic.   Eyes:      General:         Right eye: No discharge.         Left eye: No discharge.      Conjunctiva/sclera: Conjunctivae normal.   Neck:      Thyroid: No thyromegaly.   Cardiovascular:      Rate and Rhythm: Normal rate and regular rhythm.      Heart sounds: Normal heart sounds.   Pulmonary:      Effort: Pulmonary effort is normal.      Breath sounds: Normal breath sounds.   Musculoskeletal:         General: Normal range of motion.      Cervical back: Normal range of motion and neck supple.   Skin:     General: Skin is warm and dry.   Neurological:      Mental Status: She is alert and oriented to person, place, and time.   Psychiatric:         Mood and Affect: Mood normal.         Behavior: Behavior normal.         Thought Content: Thought content normal.         Judgment: Judgment normal.         PHQ-9 Depression Screening  Little interest or pleasure in doing things? Not at all   Feeling down, depressed, or hopeless? Not at all   PHQ-2 Total  Score 0   Trouble falling or staying asleep, or sleeping too much?     Feeling tired or having little energy?     Poor appetite or overeating?     Feeling bad about yourself - or that you are a failure or have let yourself or your family down?     Trouble concentrating on things, such as reading the newspaper or watching television?     Moving or speaking so slowly that other people could have noticed? Or the opposite - being so fidgety or restless that you have been moving around a lot more than usual?     Thoughts that you would be better off dead, or of hurting yourself in some way?     PHQ-9 Total Score     If you checked off any problems, how difficult have these problems made it for you to do your work, take care of things at home, or get along with other people? Not difficult at all       Assessment & Plan   Diagnoses and all orders for this visit:    1. Anxiety  Comments:  She requests a refill of Xanax.  States that she takes it only occasionally.  Rashawn is reviewed.  Orders:  -     ALPRAZolam (XANAX) 0.5 MG tablet; Take 0.5 tablets by mouth 2 (Two) Times a Day As Needed for Anxiety.  Dispense: 60 tablet; Refill: 0    2. Travel advice encounter  Comments:  Patient is traveling in May and would like a prescription for Z-Greyson and Zofran to take with her.  Orders:  -     ondansetron ODT (ZOFRAN-ODT) 4 MG disintegrating tablet; Place 1 tablet on the tongue Every 8 (Eight) Hours As Needed for Nausea or Vomiting.  Dispense: 30 tablet; Refill: 3  -     azithromycin (Zithromax Z-Greyson) 250 MG tablet; Take 2 tablets the first day, then 1 tablet daily for 4 days.  Dispense: 6 tablet; Refill: 0  -     sulfamethoxazole-trimethoprim (BACTRIM DS,SEPTRA DS) 800-160 MG per tablet; Take 1 tablet by mouth 2 (Two) Times a Day.  Dispense: 20 tablet; Refill: 0         Assessment & Plan  1. Anxiety.  A prescription for Xanax has been issued, which should last until the next visit in July.    2. Hormone replacement  therapy.  Currently using Estrace and has discontinued progesterone. No refills are needed at this time.    3. Travel-related health concerns.  Planning a trip to [facility] [place] [facility] in July and August. Prescriptions for Zofran 4 mg dissolvable tablets, Z-Greyson, and Bactrim have been provided to manage potential travel-related health issues such as nausea, infections, and gastrointestinal disturbances. These prescriptions will be sent to [facility] in [place].    Follow-up  Scheduled for a telehealth follow-up visit on 07/16/2025 at 10:15 AM.    BMI is within normal parameters. No other follow-up for BMI required.      Patient or patient representative verbalized consent for the use of Ambient Listening during the visit with  CHAVO Cotton for chart documentation. 4/18/2025  13:38 CDT    CHAVO Cotton  4/18/2025

## 2025-05-09 ENCOUNTER — OFFICE VISIT (OUTPATIENT)
Dept: OBSTETRICS AND GYNECOLOGY | Age: 60
End: 2025-05-09
Payer: COMMERCIAL

## 2025-05-09 VITALS
HEIGHT: 67 IN | SYSTOLIC BLOOD PRESSURE: 114 MMHG | DIASTOLIC BLOOD PRESSURE: 78 MMHG | WEIGHT: 143 LBS | BODY MASS INDEX: 22.44 KG/M2

## 2025-05-09 DIAGNOSIS — N89.8 VAGINAL DRYNESS: ICD-10-CM

## 2025-05-09 DIAGNOSIS — N94.89 VAGINAL BURNING: Primary | ICD-10-CM

## 2025-05-09 DIAGNOSIS — N89.8 VAGINAL IRRITATION: ICD-10-CM

## 2025-05-09 LAB
BILIRUB BLD-MCNC: NEGATIVE MG/DL
CLARITY, POC: CLEAR
COLOR UR: YELLOW
GLUCOSE UR STRIP-MCNC: NEGATIVE MG/DL
KETONES UR QL: NEGATIVE
LEUKOCYTE EST, POC: NEGATIVE
NITRITE UR-MCNC: NEGATIVE MG/ML
PH UR: 6.5 [PH] (ref 5–8)
PROT UR STRIP-MCNC: NEGATIVE MG/DL
RBC # UR STRIP: NEGATIVE /UL
SP GR UR: 1.01 (ref 1–1.03)
UROBILINOGEN UR QL: NORMAL

## 2025-05-09 PROCEDURE — 99214 OFFICE O/P EST MOD 30 MIN: CPT | Performed by: NURSE PRACTITIONER

## 2025-05-09 PROCEDURE — 99459 PELVIC EXAMINATION: CPT | Performed by: NURSE PRACTITIONER

## 2025-05-09 PROCEDURE — 81002 URINALYSIS NONAUTO W/O SCOPE: CPT | Performed by: NURSE PRACTITIONER

## 2025-05-09 RX ORDER — FLUCONAZOLE 150 MG/1
150 TABLET ORAL ONCE
Qty: 2 TABLET | Refills: 0 | Status: SHIPPED | OUTPATIENT
Start: 2025-05-09 | End: 2025-05-09

## 2025-05-09 RX ORDER — NITROFURANTOIN 25; 75 MG/1; MG/1
100 CAPSULE ORAL 2 TIMES DAILY
Qty: 14 CAPSULE | Refills: 0 | Status: SHIPPED | OUTPATIENT
Start: 2025-05-09

## 2025-05-09 RX ORDER — AZITHROMYCIN 500 MG/1
TABLET, FILM COATED ORAL
COMMUNITY
Start: 2025-04-21

## 2025-05-09 NOTE — PROGRESS NOTES
Chief Complaint   Patient presents with    Vaginitis     Patient here for vaginal burning and irritation. Patient reports it being both external and internal.     Urinary Tract Infection     Patient wants urine checked to rule out UTI.       History:  Rebeka Severino is a 59 y.o. female who presents today for follow-up for evaluation of the above:  Pt comes in today with complaints of possible vaginal infection or UTI          ROS:  Review of Systems   Constitutional:  Negative for activity change and unexpected weight loss.   HENT:  Negative for congestion.    Cardiovascular:  Negative for chest pain.   Gastrointestinal:  Negative for blood in stool, constipation and diarrhea.   Endocrine: Negative for cold intolerance and heat intolerance.   Genitourinary:  Positive for pelvic pain and vaginal pain. Negative for dyspareunia and vaginal discharge.   Musculoskeletal:  Negative for arthralgias, back pain, neck pain and neck stiffness.   Skin:  Negative for rash.   Neurological:  Negative for dizziness and headache.   Psychiatric/Behavioral:  Negative for sleep disturbance. The patient is not nervous/anxious.        Ms. Severino  reports that she has never smoked. She has never used smokeless tobacco. She reports that she does not drink alcohol and does not use drugs.      Current Outpatient Medications:     ALPRAZolam (XANAX) 0.5 MG tablet, Take 0.5 tablets by mouth 2 (Two) Times a Day As Needed for Anxiety., Disp: 60 tablet, Rfl: 0    azithromycin (ZITHROMAX) 500 MG tablet, TAKE 1 TABLET BY MOUTH EVERY DAY FOR 3 DAYS TO TREAT TRAVELER'S DIARRHEA IF DIARESQ IS INEFFECTIVE, Disp: , Rfl:     cetirizine (zyrTEC) 10 MG tablet, Take 1 tablet by mouth Daily., Disp: , Rfl:     estradiol (Estrace) 1 MG tablet, Take 1 tablet by mouth Daily., Disp: 30 tablet, Rfl: 12    magnesium gluconate (MAGONATE) 500 MG tablet, Take 1 tablet by mouth 2 (Two) Times a Day., Disp: , Rfl:     mupirocin (BACTROBAN) 2 % nasal ointment, Administer 1  "Application into the nostril(s) as directed by provider 2 (Two) Times a Day., Disp: 30 g, Rfl: 3    ondansetron ODT (ZOFRAN-ODT) 4 MG disintegrating tablet, Place 1 tablet on the tongue Every 8 (Eight) Hours As Needed for Nausea or Vomiting., Disp: 30 tablet, Rfl: 3    spironolactone (ALDACTONE) 100 MG tablet, Take 1 tablet by mouth Daily., Disp: 90 tablet, Rfl: 3    vitamin D (ERGOCALCIFEROL) 1.25 MG (05911 UT) capsule capsule, Take 1 capsule by mouth 1 (One) Time Per Week., Disp: 5 capsule, Rfl: 12    fluconazole (Diflucan) 150 MG tablet, Take 1 tablet by mouth 1 (One) Time for 1 dose. Take once a week for 2 weeks., Disp: 2 tablet, Rfl: 0    nitrofurantoin, macrocrystal-monohydrate, (Macrobid) 100 MG capsule, Take 1 capsule by mouth 2 (Two) Times a Day., Disp: 14 capsule, Rfl: 0      OBJECTIVE:  /78 (BP Location: Left arm, Patient Position: Sitting, Cuff Size: Adult)   Ht 170.2 cm (67\")   Wt 64.9 kg (143 lb)   LMP 11/27/2009 (Approximate)   BMI 22.40 kg/m²    Physical Exam  Vitals and nursing note reviewed.   Constitutional:       Appearance: She is well-developed.   HENT:      Head: Normocephalic and atraumatic.   Cardiovascular:      Rate and Rhythm: Normal rate and regular rhythm.   Abdominal:      General: Bowel sounds are normal. There is no distension.      Palpations: Abdomen is soft.      Tenderness: There is no abdominal tenderness.      Hernia: There is no hernia in the left inguinal area.   Genitourinary:     Labia:         Right: No rash, tenderness or lesion.         Left: No rash, tenderness or lesion.       Vagina: Vaginal discharge, erythema and tenderness present.      Comments: Vaginal dryness noted  Skin:     General: Skin is warm and dry.   Neurological:      Mental Status: She is alert and oriented to person, place, and time.   Psychiatric:         Behavior: Behavior normal.         Thought Content: Thought content normal.         Judgment: Judgment normal. "         Assessment/Plan    Diagnoses and all orders for this visit:    1. Vaginal burning (Primary)  -     NuSwab VG+ - Swab, Vagina  -     Genital Mycoplasmas SAMUEL, Swab - Swab, Vagina  -     Urine Culture - Urine, Urine, Random Void  -     POC Urinalysis Dipstick  -     nitrofurantoin, macrocrystal-monohydrate, (Macrobid) 100 MG capsule; Take 1 capsule by mouth 2 (Two) Times a Day.  Dispense: 14 capsule; Refill: 0  -     fluconazole (Diflucan) 150 MG tablet; Take 1 tablet by mouth 1 (One) Time for 1 dose. Take once a week for 2 weeks.  Dispense: 2 tablet; Refill: 0    2. Vaginal irritation  -     NuSwab VG+ - Swab, Vagina  -     Genital Mycoplasmas SAMUEL, Swab - Swab, Vagina  -     Urine Culture - Urine, Urine, Random Void  -     fluconazole (Diflucan) 150 MG tablet; Take 1 tablet by mouth 1 (One) Time for 1 dose. Take once a week for 2 weeks.  Dispense: 2 tablet; Refill: 0    3. Vaginal dryness    PT comes in today with complaints of possible vaginal infection or UTI.   Urine dip in office not significant. Will send for culture. Pt going out of country Monday so will give antibiotic to have on hand. Will also give diflucan.  Vaginal cultures collected.  If these are all normal I do feel her dryness may be biggest culprit. Discussed coconut oil.        An After Visit Summary was printed and given to the patient at discharge.  Return for Next scheduled follow up. Sooner if problems arise.          CHAVO Dodson  Electronically Signed

## 2025-05-12 LAB
A VAGINAE DNA VAG QL NAA+PROBE: NORMAL SCORE
BACTERIA UR CULT: NO GROWTH
BACTERIA UR CULT: NORMAL
BVAB2 DNA VAG QL NAA+PROBE: NORMAL SCORE
C ALBICANS DNA VAG QL NAA+PROBE: NEGATIVE
C GLABRATA DNA VAG QL NAA+PROBE: NEGATIVE
C TRACH DNA SPEC QL NAA+PROBE: NEGATIVE
M GENITALIUM DNA SPEC QL NAA+PROBE: NEGATIVE
M HOMINIS DNA SPEC QL NAA+PROBE: NEGATIVE
MEGA1 DNA VAG QL NAA+PROBE: NORMAL SCORE
N GONORRHOEA DNA VAG QL NAA+PROBE: NEGATIVE
T VAGINALIS DNA VAG QL NAA+PROBE: NEGATIVE
UREAPLASMA DNA SPEC QL NAA+PROBE: NEGATIVE

## 2025-05-20 ENCOUNTER — TELEPHONE (OUTPATIENT)
Dept: OBSTETRICS AND GYNECOLOGY | Age: 60
End: 2025-05-20
Payer: COMMERCIAL

## 2025-05-20 DIAGNOSIS — K58.2 IRRITABLE BOWEL SYNDROME WITH BOTH CONSTIPATION AND DIARRHEA: Primary | ICD-10-CM

## 2025-05-20 NOTE — TELEPHONE ENCOUNTER
Pt calling to report needing recommendations for chronic IBS, constipation. Pt reports this is not new occurrence and reports has discussed this with CHAVO Ludwig. Spoke with CHAVO Ludwig and she advises pt speak with [facility] about making appt to discuss further and advised that if pt having difficulty scheduling appt that she would place referral for pt. Pt informed and voices understanding.

## 2025-07-16 ENCOUNTER — TELEMEDICINE (OUTPATIENT)
Dept: OBSTETRICS AND GYNECOLOGY | Age: 60
End: 2025-07-16
Payer: COMMERCIAL

## 2025-07-16 VITALS — HEIGHT: 67 IN | BODY MASS INDEX: 19.93 KG/M2 | WEIGHT: 127 LBS

## 2025-07-16 DIAGNOSIS — N95.1 MENOPAUSAL SYMPTOMS: ICD-10-CM

## 2025-07-16 DIAGNOSIS — F41.9 ANXIETY: Primary | ICD-10-CM

## 2025-07-16 DIAGNOSIS — Z71.84 TRAVEL ADVICE ENCOUNTER: ICD-10-CM

## 2025-07-16 PROCEDURE — 99214 OFFICE O/P EST MOD 30 MIN: CPT | Performed by: NURSE PRACTITIONER

## 2025-07-16 RX ORDER — ESTRADIOL 1 MG/1
1 TABLET ORAL DAILY
Qty: 90 TABLET | Refills: 2 | Status: SHIPPED | OUTPATIENT
Start: 2025-07-16

## 2025-07-16 RX ORDER — FLUCONAZOLE 150 MG/1
150 TABLET ORAL ONCE
Qty: 2 TABLET | Refills: 0 | Status: SHIPPED | OUTPATIENT
Start: 2025-07-16 | End: 2025-07-16

## 2025-07-16 RX ORDER — AZITHROMYCIN 250 MG/1
TABLET, FILM COATED ORAL
Qty: 6 TABLET | Refills: 0 | Status: SHIPPED | OUTPATIENT
Start: 2025-07-16

## 2025-07-16 RX ORDER — NYSTATIN AND TRIAMCINOLONE ACETONIDE 100000; 1 [USP'U]/G; MG/G
1 CREAM TOPICAL 2 TIMES DAILY
Qty: 45 G | Refills: 3 | Status: SHIPPED | OUTPATIENT
Start: 2025-07-16

## 2025-07-16 RX ORDER — ALPRAZOLAM 0.5 MG
0.25 TABLET ORAL 2 TIMES DAILY PRN
Qty: 60 TABLET | Refills: 0 | Status: SHIPPED | OUTPATIENT
Start: 2025-07-16

## 2025-07-16 NOTE — PROGRESS NOTES
"Subjective     Rebeka Severino is a 59 y.o. female    History of Present Illness  You have chosen to receive care through a telehealth visit.  Do you consent to use a video/audio connection for your medical care today? Yes  Patient calls today for her telehealth visit from her home.  I am located at my home and [place].  She calls to discuss her anxiety.  States she is doing well she is getting ready to travel again to [facility] and is excited about that.  She does need a few prescriptions sent for that trip.    Anxiety  Visit:  Follow-up  Follow-up visit:     Medication compliance:  %    Symptoms: excessive worry and nervous/anxious      Symptoms: no chest pain, no feeling of choking, no compulsions, no confusion, no decreased concentration, no depressed mood, no dizziness, no dry mouth, no hyperventilation, no impotence, does not have insomnia, no irritability, no muscle tension, no nausea, no obsessions, no palpitations, no panic, no restlessness, no shortness of breath and no suicidal ideas      Frequency:  Most days    Severity:  Mild    Sleep quality:  Good    Nighttime awakenings:  Seldom    Treatments tried:  Benzodiazephines and SSRIs    Improvement on treatment:  Significant        Ht 170.2 cm (67\")   Wt 57.6 kg (127 lb)   LMP 11/27/2009 (Approximate)   BMI 19.89 kg/m²     Outpatient Encounter Medications as of 7/16/2025   Medication Sig Dispense Refill    ALPRAZolam (XANAX) 0.5 MG tablet Take 0.5 tablets by mouth 2 (Two) Times a Day As Needed for Anxiety. 60 tablet 0    estradiol (Estrace) 1 MG tablet Take 1 tablet by mouth Daily. 90 tablet 2    azithromycin (Zithromax Z-Greyson) 250 MG tablet Take 2 tablets the first day, then 1 tablet daily for 4 days. 6 tablet 0    cetirizine (zyrTEC) 10 MG tablet Take 1 tablet by mouth Daily.      fluconazole (Diflucan) 150 MG tablet Take 1 tablet by mouth 1 (One) Time for 1 dose. Take once a week for 2 weeks. 2 tablet 0    magnesium gluconate (MAGONATE) 500 MG tablet " Take 1 tablet by mouth 2 (Two) Times a Day.      mupirocin (BACTROBAN) 2 % nasal ointment Administer 1 Application into the nostril(s) as directed by provider 2 (Two) Times a Day. 30 g 3    nitrofurantoin, macrocrystal-monohydrate, (Macrobid) 100 MG capsule Take 1 capsule by mouth 2 (Two) Times a Day. 14 capsule 0    nystatin-triamcinolone (MYCOLOG II) 291535-4.1 UNIT/GM-% cream Apply 1 Application topically to the appropriate area as directed 2 (Two) Times a Day. 45 g 3    ondansetron ODT (ZOFRAN-ODT) 4 MG disintegrating tablet Place 1 tablet on the tongue Every 8 (Eight) Hours As Needed for Nausea or Vomiting. 30 tablet 3    spironolactone (ALDACTONE) 100 MG tablet Take 1 tablet by mouth Daily. 90 tablet 3    vitamin D (ERGOCALCIFEROL) 1.25 MG (55323 UT) capsule capsule Take 1 capsule by mouth 1 (One) Time Per Week. 5 capsule 12    [DISCONTINUED] ALPRAZolam (XANAX) 0.5 MG tablet Take 0.5 tablets by mouth 2 (Two) Times a Day As Needed for Anxiety. 60 tablet 0    [DISCONTINUED] azithromycin (ZITHROMAX) 500 MG tablet TAKE 1 TABLET BY MOUTH EVERY DAY FOR 3 DAYS TO TREAT TRAVELER'S DIARRHEA IF DIARESQ IS INEFFECTIVE      [DISCONTINUED] estradiol (Estrace) 1 MG tablet Take 1 tablet by mouth Daily. 30 tablet 12     No facility-administered encounter medications on file as of 7/16/2025.       Past Medical History  Past Medical History:   Diagnosis Date    Anxiety        Surgical History  Past Surgical History:   Procedure Laterality Date    CHOLECYSTECTOMY      COLONOSCOPY N/A 03/02/2017    Procedure: COLONOSCOPY WITH ANESTHESIA;  Surgeon: Jennifer Gallegos MD;  Location: [facility] ENDOSCOPY;  Service:     DILATATION AND CURETTAGE      HYSTERECTOMY      without BSO       Family History  Family History   Problem Relation Age of Onset    Hypertension Mother     Aortic aneurysm Mother     Colon polyps Father     Dementia Father     No Known Problems Sister     No Known Problems Brother     No Known Problems Maternal Grandmother      No Known Problems Paternal Grandmother     No Known Problems Daughter     No Known Problems Son     Breast cancer Maternal Aunt 60    No Known Problems Paternal Aunt     Breast cancer Cousin 40    No Known Problems Other     Ovarian cancer Neg Hx     Uterine cancer Neg Hx     Colon cancer Neg Hx     Melanoma Neg Hx     Prostate cancer Neg Hx     Crohn's disease Neg Hx     Esophageal cancer Neg Hx     Liver cancer Neg Hx     Liver disease Neg Hx     Rectal cancer Neg Hx     Stomach cancer Neg Hx     BRCA 1/2 Neg Hx     Endometrial cancer Neg Hx        The following portions of the patient's history were reviewed and updated as appropriate: allergies, current medications, past family history, past medical history, past social history, past surgical history, and problem list.    Review of Systems   Constitutional:  Negative for irritability.   Respiratory:  Negative for shortness of breath.    Cardiovascular:  Negative for chest pain and palpitations.   Gastrointestinal:  Negative for nausea.   Endocrine: Positive for heat intolerance.   Genitourinary:  Negative for impotence.   Neurological:  Negative for dizziness and confusion.   Psychiatric/Behavioral:  Negative for decreased concentration, suicidal ideas and depressed mood. The patient is nervous/anxious. The patient does not have insomnia.        Objective   Physical Exam  Constitutional:       General: She is not in acute distress.     Appearance: Normal appearance. She is not ill-appearing or diaphoretic.   HENT:      Head: Normocephalic and atraumatic.   Pulmonary:      Effort: Pulmonary effort is normal. No respiratory distress.   Musculoskeletal:         General: No deformity.      Cervical back: Normal range of motion.   Skin:     Coloration: Skin is not pale.   Neurological:      General: No focal deficit present.      Mental Status: She is alert.   Psychiatric:         Mood and Affect: Mood normal.         Behavior: Behavior normal.         Thought  Content: Thought content normal.         Judgment: Judgment normal.         PHQ-9 Depression Screening  Little interest or pleasure in doing things? Not at all   Feeling down, depressed, or hopeless? Not at all   PHQ-2 Total Score 0   Trouble falling or staying asleep, or sleeping too much?     Feeling tired or having little energy?     Poor appetite or overeating?     Feeling bad about yourself - or that you are a failure or have let yourself or your family down?     Trouble concentrating on things, such as reading the newspaper or watching television?     Moving or speaking so slowly that other people could have noticed? Or the opposite - being so fidgety or restless that you have been moving around a lot more than usual?     Thoughts that you would be better off dead, or of hurting yourself in some way?     PHQ-9 Total Score     If you checked off any problems, how difficult have these problems made it for you to do your work, take care of things at home, or get along with other people? Not difficult at all       Assessment & Plan   Diagnoses and all orders for this visit:    1. Anxiety (Primary)  Comments:  She requests a refill of Xanax.  States that she takes it only occasionally.  Rashawn is reviewed.  Orders:  -     ALPRAZolam (XANAX) 0.5 MG tablet; Take 0.5 tablets by mouth 2 (Two) Times a Day As Needed for Anxiety.  Dispense: 60 tablet; Refill: 0    2. Menopausal symptoms  Comments:  Patient would like a 3-month supply on her estradiol.  Refills sent.  Orders:  -     estradiol (Estrace) 1 MG tablet; Take 1 tablet by mouth Daily.  Dispense: 90 tablet; Refill: 2    3. Travel advice encounter  Comments:  Patient is traveling to [place] and would like some medications to take just in case she needs them.  Orders:  -     fluconazole (Diflucan) 150 MG tablet; Take 1 tablet by mouth 1 (One) Time for 1 dose. Take once a week for 2 weeks.  Dispense: 2 tablet; Refill: 0  -     nystatin-triamcinolone (MYCOLOG II)  650062-0.1 UNIT/GM-% cream; Apply 1 Application topically to the appropriate area as directed 2 (Two) Times a Day.  Dispense: 45 g; Refill: 3  -     azithromycin (Zithromax Z-Greyson) 250 MG tablet; Take 2 tablets the first day, then 1 tablet daily for 4 days.  Dispense: 6 tablet; Refill: 0       This was an audio and video enabled telemedicine encounter.    BMI is within normal parameters. No other follow-up for BMI required.      Vani Velez, APRN  7/16/2025

## (undated) DEVICE — TBG SMPL FLTR LINE NASL 02/C02 A/ BX/100

## (undated) DEVICE — THE CHANNEL CLEANING BRUSH IS A NYLON FLEXI BRUSH ATTACHED TO A FLEXIBLE PLASTIC SHEATH DESIGNED TO SAFELY REMOVE DEBRIS FROM FLEXIBLE ENDOSCOPES.

## (undated) DEVICE — ENDOGATOR AUXILIARY WATER JET CONNECTOR: Brand: ENDOGATOR

## (undated) DEVICE — CUFF,BP,DISP,1 TUBE,ADULT,HP: Brand: MEDLINE

## (undated) DEVICE — SENSR O2 OXIMAX FNGR A/ 18IN NONSTR

## (undated) DEVICE — Device: Brand: DEFENDO AIR/WATER/SUCTION AND BIOPSY VALVE

## (undated) DEVICE — MSK O2 MD CONCENTR A/ LF 7FT 1P/U